# Patient Record
Sex: MALE | Race: WHITE
[De-identification: names, ages, dates, MRNs, and addresses within clinical notes are randomized per-mention and may not be internally consistent; named-entity substitution may affect disease eponyms.]

---

## 2023-07-05 ENCOUNTER — HOSPITAL ENCOUNTER (EMERGENCY)
Dept: HOSPITAL 74 - JERFT | Age: 53
Discharge: HOME | End: 2023-07-05
Payer: COMMERCIAL

## 2023-07-05 VITALS
HEART RATE: 112 BPM | SYSTOLIC BLOOD PRESSURE: 150 MMHG | RESPIRATION RATE: 18 BRPM | TEMPERATURE: 97.6 F | DIASTOLIC BLOOD PRESSURE: 90 MMHG

## 2023-07-05 VITALS — BODY MASS INDEX: 40.4 KG/M2
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
## 2023-07-05 DIAGNOSIS — S61.210A: Primary | ICD-10-CM

## 2024-01-17 PROBLEM — Z00.00 ENCOUNTER FOR PREVENTIVE HEALTH EXAMINATION: Status: ACTIVE | Noted: 2024-01-17

## 2024-01-18 ENCOUNTER — APPOINTMENT (OUTPATIENT)
Dept: SURGERY | Facility: CLINIC | Age: 54
End: 2024-01-18
Payer: COMMERCIAL

## 2024-01-18 ENCOUNTER — NON-APPOINTMENT (OUTPATIENT)
Age: 54
End: 2024-01-18

## 2024-01-18 VITALS
DIASTOLIC BLOOD PRESSURE: 82 MMHG | TEMPERATURE: 98.2 F | WEIGHT: 298 LBS | SYSTOLIC BLOOD PRESSURE: 140 MMHG | HEART RATE: 116 BPM | HEIGHT: 74 IN | RESPIRATION RATE: 16 BRPM | BODY MASS INDEX: 38.24 KG/M2 | OXYGEN SATURATION: 96 %

## 2024-01-18 PROCEDURE — 99204 OFFICE O/P NEW MOD 45 MIN: CPT

## 2024-01-19 ENCOUNTER — TRANSCRIPTION ENCOUNTER (OUTPATIENT)
Age: 54
End: 2024-01-19

## 2024-01-26 ENCOUNTER — RESULT REVIEW (OUTPATIENT)
Age: 54
End: 2024-01-26

## 2024-01-26 ENCOUNTER — APPOINTMENT (OUTPATIENT)
Dept: HEMATOLOGY ONCOLOGY | Facility: CLINIC | Age: 54
End: 2024-01-26
Payer: COMMERCIAL

## 2024-01-26 VITALS
TEMPERATURE: 97.7 F | BODY MASS INDEX: 38.89 KG/M2 | DIASTOLIC BLOOD PRESSURE: 83 MMHG | SYSTOLIC BLOOD PRESSURE: 140 MMHG | WEIGHT: 303 LBS | HEART RATE: 118 BPM | HEIGHT: 74 IN | OXYGEN SATURATION: 97 % | RESPIRATION RATE: 16 BRPM

## 2024-01-26 DIAGNOSIS — Z86.79 PERSONAL HISTORY OF OTHER DISEASES OF THE CIRCULATORY SYSTEM: ICD-10-CM

## 2024-01-26 DIAGNOSIS — Z87.19 PERSONAL HISTORY OF OTHER DISEASES OF THE DIGESTIVE SYSTEM: ICD-10-CM

## 2024-01-26 DIAGNOSIS — Z87.828 PERSONAL HISTORY OF OTHER (HEALED) PHYSICAL INJURY AND TRAUMA: ICD-10-CM

## 2024-01-26 DIAGNOSIS — Z86.39 PERSONAL HISTORY OF OTHER ENDOCRINE, NUTRITIONAL AND METABOLIC DISEASE: ICD-10-CM

## 2024-01-26 DIAGNOSIS — Z87.891 PERSONAL HISTORY OF NICOTINE DEPENDENCE: ICD-10-CM

## 2024-01-26 DIAGNOSIS — M19.90 UNSPECIFIED OSTEOARTHRITIS, UNSPECIFIED SITE: ICD-10-CM

## 2024-01-26 DIAGNOSIS — Z80.0 FAMILY HISTORY OF MALIGNANT NEOPLASM OF DIGESTIVE ORGANS: ICD-10-CM

## 2024-01-26 DIAGNOSIS — I10 ESSENTIAL (PRIMARY) HYPERTENSION: ICD-10-CM

## 2024-01-26 PROCEDURE — G2212 PROLONG OUTPT/OFFICE VIS: CPT

## 2024-01-26 PROCEDURE — 99205 OFFICE O/P NEW HI 60 MIN: CPT

## 2024-01-26 RX ORDER — LEVOTHYROXINE SODIUM 0.17 MG/1
TABLET ORAL
Refills: 0 | Status: ACTIVE | COMMUNITY

## 2024-01-26 RX ORDER — MERCAPTOPURINE 50 MG/1
50 TABLET ORAL
Refills: 0 | Status: ACTIVE | COMMUNITY

## 2024-01-26 RX ORDER — ENALAPRIL MALEATE 5 MG/1
TABLET ORAL
Refills: 0 | Status: COMPLETED | COMMUNITY
End: 2024-01-26

## 2024-01-26 RX ORDER — ASPIRIN 81 MG
81 TABLET, DELAYED RELEASE (ENTERIC COATED) ORAL
Refills: 0 | Status: COMPLETED | COMMUNITY
End: 2024-01-26

## 2024-01-29 NOTE — ASSESSMENT
[FreeTextEntry1] : Mr.Eugene Myers is a 53 year old male who presents to the office for r/o colon cancer. Recent admission for bowel obstruction in 1/2024 with colonoscopy showing a proximal stricture with biopsy around the rim showing 1 area with at least intramucosal adenocarcinoma. In the rectum, there was an ulcerated area with focal high-grade dysplasia in the background of low-grade dysplasia.   Given at least intramucosal adenocarcinoma and the rectal findings, it was recommended to undergo total proctocolectomy. However, patient is very hesitant and would like to evaluate other possible options.   Discussed at length the pathology findings which could signify that the intramucosal lesion is a carryover from the rectal area or the possibility that these are two synchronous lesions. Explained that a total proctocolectomy would provide the greatest possibility of removal of all possible tumor.  If he chooses not to undergo the surgery, another option might be to undergo hemicolectomy and continue close observation of rectal lesion.  Will obtain MRI to better visualize rectal area.  Will discuss with Dr. Lyons  (colorectal surgery)  >CBC, CMP, CEA >MRI Pelvis to evaluate rectal area >benefit from TB discussion  RTC in 2 weeks

## 2024-01-29 NOTE — HISTORY OF PRESENT ILLNESS
[de-identified] : Mr.Eugene Myers is a 53 year old male who presents to the office for r/o colon cancer.  In 1/2/2024, patient was admitted for severe abdominal pain and was found to have a bowel obstruction.  Colonoscopy showed a stricture with polypoid feature proximal colon-suspect malignant, and a 6mm sigmoid colon polyp. biopsy around the rim and rectal area showed 1 area with at least intramucosal adenocarcinoma. In the rectum, there was an ulcerated area with focal high-grade dysplasia in the background of low-grade dysplasia.   CT C/A/P 1/2/2024: obstructing/stricture ring lesion at the splenic flexure of the colon, most likely adenocarcinoma. No evidence of metastatic disease. Last colonoscopy 10/2023 was negative  Patient had an NG tube placed for decompression. In the hospital he saw a colorectal surgeon who recommended a total proctocolectomy with ileal pouch.  Here today with daughter and aunt. He feels much better since discharge. No longer has abdominal pain. Has seen surgery and is very hesitant to agree to total proctocolectomy.  PATHOLOGY at [facility]: At the site of the right rim of structure: the microscopic description showed: one fragment of inflammatory type polyp and one somewhat degenerated fragment of mucosa with intersecting glands composed of mitotically active irregular nuclei, concerning for at least intramucosal adenocarcinoma. These cells largely lack goblet cells and do not appear classically colonic or intestinal.  No lymphocascular invasion                                 Health Maintenace:  Colonoscopy: 8 months before this instance: March 2023 - negative Prostate exam: 10 years ago  Family hx:  Mother: throat ca: 50s   Social hx:  Smoker: former smoker: 15 years 1.5 packs a day ETOH: none Illicit drug: none Work:

## 2024-01-29 NOTE — PHYSICAL EXAM
[Obese] : obese [Normal] : normal appearance, no rash, nodules, vesicles, ulcers, erythema [de-identified] : umbilical hernia

## 2024-02-06 ENCOUNTER — RESULT REVIEW (OUTPATIENT)
Age: 54
End: 2024-02-06

## 2024-02-07 ENCOUNTER — NON-APPOINTMENT (OUTPATIENT)
Age: 54
End: 2024-02-07

## 2024-02-09 ENCOUNTER — APPOINTMENT (OUTPATIENT)
Dept: HEMATOLOGY ONCOLOGY | Facility: CLINIC | Age: 54
End: 2024-02-09
Payer: COMMERCIAL

## 2024-02-09 VITALS
WEIGHT: 310.19 LBS | TEMPERATURE: 97.6 F | DIASTOLIC BLOOD PRESSURE: 83 MMHG | HEART RATE: 61 BPM | OXYGEN SATURATION: 97 % | HEIGHT: 74 IN | BODY MASS INDEX: 39.81 KG/M2 | RESPIRATION RATE: 16 BRPM | SYSTOLIC BLOOD PRESSURE: 159 MMHG

## 2024-02-09 VITALS — DIASTOLIC BLOOD PRESSURE: 83 MMHG | SYSTOLIC BLOOD PRESSURE: 144 MMHG

## 2024-02-09 PROCEDURE — 99214 OFFICE O/P EST MOD 30 MIN: CPT

## 2024-02-09 RX ORDER — SODIUM SULFATE, POTASSIUM SULFATE AND MAGNESIUM SULFATE 1.6; 3.13; 17.5 G/177ML; G/177ML; G/177ML
17.5-3.13-1.6 SOLUTION ORAL
Qty: 1 | Refills: 0 | Status: ACTIVE | COMMUNITY
Start: 2024-02-09 | End: 1900-01-01

## 2024-02-09 NOTE — PHYSICAL EXAM
[Obese] : obese [Normal] : normal appearance, no rash, nodules, vesicles, ulcers, erythema [Fully active, able to carry on all pre-disease performance without restriction] : Status 0 - Fully active, able to carry on all pre-disease performance without restriction [de-identified] : umbilical hernia

## 2024-02-09 NOTE — ASSESSMENT
[FreeTextEntry1] : Mr.Eugene Myers is a 53 year old male who presents to the office for r/o colon cancer. Recent admission for bowel obstruction in 1/2024 with colonoscopy showing a proximal stricture with biopsy around the rim showing 1 area with at least intramucosal adenocarcinoma. In the rectum, there was an ulcerated area with focal high-grade dysplasia in the background of low-grade dysplasia.   Given at least intramucosal adenocarcinoma and the rectal findings, it was recommended to undergo total proctocolectomy. However, patient is very hesitant and would like to evaluate other possible options.   Discussed at length the pathology findings which could signify that the intramucosal lesion is a carryover from the rectal area or the possibility that these are two synchronous lesions. Explained that a total proctocolectomy would provide the greatest possibility of removal of all possible tumor.  If he chooses not to undergo the surgery, another option might be to undergo hemicolectomy and continue close observation of rectal lesion.  MRI showed no visible tumor or LAD. No indication for oncologic treatment. Discussed again with patient the importance of undergoing surgery however will repeat colonoscopy on Monday to re-evaluate rectal area.   Patient states he will decide which surgery he would like to proceed with by next week pending colonoscopy results.   >follow up with GI for colonoscopy on 2/12 >follow up with surgery- Dr. Lyons

## 2024-02-11 ENCOUNTER — RESULT REVIEW (OUTPATIENT)
Age: 54
End: 2024-02-11

## 2024-02-12 ENCOUNTER — APPOINTMENT (OUTPATIENT)
Dept: GASTROENTEROLOGY | Facility: HOSPITAL | Age: 54
End: 2024-02-12

## 2024-02-20 ENCOUNTER — APPOINTMENT (OUTPATIENT)
Dept: SURGERY | Facility: CLINIC | Age: 54
End: 2024-02-20
Payer: COMMERCIAL

## 2024-02-20 ENCOUNTER — NON-APPOINTMENT (OUTPATIENT)
Age: 54
End: 2024-02-20

## 2024-02-20 VITALS
HEART RATE: 121 BPM | HEIGHT: 74 IN | DIASTOLIC BLOOD PRESSURE: 91 MMHG | WEIGHT: 310 LBS | OXYGEN SATURATION: 97 % | SYSTOLIC BLOOD PRESSURE: 150 MMHG | BODY MASS INDEX: 39.78 KG/M2 | RESPIRATION RATE: 16 BRPM

## 2024-02-20 PROCEDURE — 99213 OFFICE O/P EST LOW 20 MIN: CPT

## 2024-02-20 RX ORDER — METRONIDAZOLE 500 MG/1
500 TABLET ORAL
Qty: 6 | Refills: 0 | Status: ACTIVE | COMMUNITY
Start: 2024-02-20 | End: 1900-01-01

## 2024-02-20 RX ORDER — NEOMYCIN SULFATE 500 MG/1
500 TABLET ORAL
Qty: 6 | Refills: 0 | Status: ACTIVE | COMMUNITY
Start: 2024-02-20 | End: 1900-01-01

## 2024-02-20 NOTE — DATA REVIEWED
[FreeTextEntry1] : Outside CTs and admission for possible reviewed.  Hepatic flexure lesion with large bowel obstruction.

## 2024-02-20 NOTE — HISTORY OF PRESENT ILLNESS
[FreeTextEntry1] : 53-year-old male here for initial valuation of recent mission to [facility] for large bowel obstruction.  Patient underwent CT abdomen pelvis at that time which showed a stricture at the hepatic flexure.  The initial CT read was showing a splenic flexure lesion but this was addended and noted to be at the hepatic flexure.  He subsequent underwent a colonoscopy which showed an obstructing lesion at the hepatic flexure which was biopsied as invasive adenocarcinoma.  There is some possible induration in the rectum which was also biopsied and was found to be high-grade dysplasia.  Patient has a history of ulcerative colitis for at least the past 6 years and has had annual colonoscopies.  His last colonoscopy was in 2023.  He considers his UC generally well controlled now that he is on medication.  Based on the invasive cancer and the fact flexure and dysplasia in the rectum the patient was recommended to undergo a total proctocolectomy.  He is here for second opinion regarding these recommendations.  He feels much better since the discharge from the hospital.  Is been eating and drink without issue.  Has been careful about his diet so he does not have an obstruction while deciding steps.  He underwent a CT chest in hospital as well which showed no lesions in the chest.  Patient has significant concerns about his ability to live with a permanent end ileostomy or a J-pouch as a self-employed  with limited financial resources.

## 2024-02-20 NOTE — PHYSICAL EXAM
[Abdomen Masses] : No abdominal masses [Abdomen Tenderness] : ~T No ~M abdominal tenderness [JVD] : no jugular venous distention  [Respiratory Effort] : normal respiratory effort [No Rash or Lesion] : No rash or lesion [Alert] : alert [Calm] : calm [de-identified] : No acute distress

## 2024-02-20 NOTE — HISTORY OF PRESENT ILLNESS
[FreeTextEntry1] : 53-year-old male here for initial valuation of recent mission to [facility] for large bowel obstruction.  Patient underwent CT abdomen pelvis at that time which showed a stricture at the hepatic flexure.  The initial CT read was showing a splenic flexure lesion but this was addended and noted to be at the hepatic flexure.  He subsequent underwent a colonoscopy which showed an obstructing lesion at the hepatic flexure which was biopsied as invasive adenocarcinoma.  There is some possible induration in the rectum which was also biopsied and was found to be high-grade dysplasia.  Patient has a history of ulcerative colitis for at least the past 6 years and has had annual colonoscopies.  His last colonoscopy was in 2023.  He considers his UC generally well controlled now that he is on medication.  Based on the invasive cancer and the fact flexure and dysplasia in the rectum the patient was recommended to undergo a total proctocolectomy.  He is here for second opinion regarding these recommendations.  He feels much better since the discharge from the hospital.  Is been eating and drink without issue.  Has been careful about his diet so he does not have an obstruction while deciding steps.  He underwent a CT chest in hospital as well which showed no lesions in the chest.  Patient has significant concerns about his ability to live with a permanent end ileostomy or a J-pouch as a self-employed  with limited financial resources.  Update-patient here for follow-up having completed a pelvic MRI per oncology recommendations to assess any rectal lesions and a colonoscopy with Dr. Mae.  The pelvic MRI shows no lesions in the rectum.  Dr. Damian completed colonoscopy to the hepatic flexure which showed invasive lesion which she tattooed distally.  Biopsies from this came back as invasive adenocarcinoma.  He did not identify any dysplastic appearing areas in the remaining portion of the colon and the rectum appeared completely normal at this time.  A biopsy from 1 area in the descending colon showed hyperplastic polyp.  Had extensive discussion again with the patient regarding surgical options would like to proceed with robotic assisted right hemicolectomy.

## 2024-02-20 NOTE — PHYSICAL EXAM
[Abdomen Masses] : No abdominal masses [Abdomen Tenderness] : ~T No ~M abdominal tenderness [JVD] : no jugular venous distention  [Respiratory Effort] : normal respiratory effort [No Rash or Lesion] : No rash or lesion [Alert] : alert [Calm] : calm [de-identified] : No acute distress

## 2024-02-20 NOTE — ASSESSMENT
[FreeTextEntry1] : 53-year-old male here for initial valuation for history of ulcerative colitis and invasive adenocarcinoma of the hepatic flexure.  Initial biopsies done outside also show concern for dysplasia in the rectum.  Reviewed with the patient that the standard of care would be a total proctocolectomy if he does have multifocal high-grade dysplasia or invasive cancer 1 area in high-grade dysplasia and another.  Patient like a second opinion so we will recommend that he have a repeat colonoscopy with Dr. Mae to assess the primary lesion as well as repeat biopsies for any abnormal appearing areas in the remaining colon.  Patient will follow-up with oncology as well.  Return for follow-up after completing colonoscopy so that we can have further discussions about next steps for surgery.

## 2024-02-20 NOTE — ASSESSMENT
[FreeTextEntry1] : 53-year-old male with ulcerative colitis and a recently diagnosed invasive adenocarcinoma in the hepatic flexure.  No evidence of distant metastatic disease on CT chest abdomen pelvis.  CEA was normal.  Once again had a long discussion about options for surgery including total proctocolectomy with end ileostomy versus total proctocolectomy with J-pouch versus segmental right hemicolectomy.  Patient expressed significant concern over the issues he will faced with a J-pouch and end ileostomy as a self-employed .  He would like to proceed with a robotic assisted right hemicolectomy understanding the risk for invasive cancer in the remaining portion of his colon.  Risk benefits of robotic assisted right hemicolectomy discussed.  Risk include infection, bleeding, anastomotic complications such as abscess or leak.  Risk of DVT and PE as well.  Patient will complete full bowel prep prior to surgery.  Neomycin and Flagyl ordered to his pharmacy.  Will get presurgical testing for risk assessment and appropriate labs.  Will need close colonoscopy follow-up after resection with the increased risk for invasive adenocarcinoma in the remaining portion of the colon.

## 2024-02-21 ENCOUNTER — TRANSCRIPTION ENCOUNTER (OUTPATIENT)
Age: 54
End: 2024-02-21

## 2024-02-28 ENCOUNTER — RESULT REVIEW (OUTPATIENT)
Age: 54
End: 2024-02-28

## 2024-02-29 ENCOUNTER — APPOINTMENT (OUTPATIENT)
Dept: SURGERY | Facility: HOSPITAL | Age: 54
End: 2024-02-29

## 2024-03-01 ENCOUNTER — TRANSCRIPTION ENCOUNTER (OUTPATIENT)
Age: 54
End: 2024-03-01

## 2024-03-15 ENCOUNTER — RESULT REVIEW (OUTPATIENT)
Age: 54
End: 2024-03-15

## 2024-03-15 ENCOUNTER — APPOINTMENT (OUTPATIENT)
Dept: HEMATOLOGY ONCOLOGY | Facility: CLINIC | Age: 54
End: 2024-03-15
Payer: COMMERCIAL

## 2024-03-15 ENCOUNTER — APPOINTMENT (OUTPATIENT)
Dept: SURGERY | Facility: CLINIC | Age: 54
End: 2024-03-15
Payer: COMMERCIAL

## 2024-03-15 VITALS
HEART RATE: 103 BPM | WEIGHT: 310 LBS | BODY MASS INDEX: 39.78 KG/M2 | HEIGHT: 74 IN | SYSTOLIC BLOOD PRESSURE: 132 MMHG | DIASTOLIC BLOOD PRESSURE: 85 MMHG

## 2024-03-15 VITALS
BODY MASS INDEX: 38.89 KG/M2 | TEMPERATURE: 97.6 F | HEIGHT: 74 IN | HEART RATE: 105 BPM | SYSTOLIC BLOOD PRESSURE: 131 MMHG | WEIGHT: 303 LBS | OXYGEN SATURATION: 96 % | RESPIRATION RATE: 16 BRPM | DIASTOLIC BLOOD PRESSURE: 82 MMHG

## 2024-03-15 PROCEDURE — 99215 OFFICE O/P EST HI 40 MIN: CPT

## 2024-03-15 PROCEDURE — G2211 COMPLEX E/M VISIT ADD ON: CPT

## 2024-03-15 PROCEDURE — 99024 POSTOP FOLLOW-UP VISIT: CPT

## 2024-03-15 NOTE — ASSESSMENT
[FreeTextEntry1] : 53-year-old male here for postop visit after robotic assisted right hemicolectomy for hepatic flexure mass consistent with stage III colon cancer.  Reviewed the staging of colon cancer the patient and the meaning of positive lymph nodes.  Patient will follow-up with Dr. Ferrera later today to discuss adjuvant chemotherapy.  Reviewed expectations for recovery over the next 4 to 6 weeks.  Bowel function will continue to resolve over the next 3 to 6 months.  Can increase his fiber which will help normal return of bowel function.  Recommend follow-up with Dr. Mae for surveillance colonoscopy given his prior findings on original colonoscopy of dysplasia in the rectum.  She would likely have a surveillance colonoscopy in 3 to 6 months as opposed to usual 1 year follow-up.  He would also like to establish primary GI care with Dr. Mae for his ulcerative colitis.  Follow-up in 4 to 6 weeks to assess recovery and return of normal bowel function.

## 2024-03-15 NOTE — PHYSICAL EXAM
[Abdomen Masses] : No abdominal masses [Abdomen Tenderness] : ~T No ~M abdominal tenderness [Respiratory Effort] : normal respiratory effort [JVD] : no jugular venous distention  [No Rash or Lesion] : No rash or lesion [Alert] : alert [de-identified] : Incisions clean dry and intact without erythema or drainage.  Surgical glue starting to fall off. [Calm] : calm [de-identified] : No acute distress

## 2024-03-15 NOTE — HISTORY OF PRESENT ILLNESS
[FreeTextEntry1] : 53-year-old male returns for follow-up after robotic assisted right hemicolectomy for hepatic flexure colon cancer.  Patient also has a history of ulcerative pancolitis.  Reviewed pathology with the patient showing a stage III cancer with involvement of 8 lymph nodes.  Patient has follow-up with Dr. Ferrera after this visit to discuss adjuvant chemotherapy.  Recommend patient also follow-up with Dr. Mae for surveillance colonoscopy.  Patient noted doing well since surgery.  Has occasional tender the pain that continue to improve.  Still has significantly loose bowel movements although they are slowly improving.  Believes incisions are healing well.

## 2024-03-17 NOTE — PHYSICAL EXAM
[Fully active, able to carry on all pre-disease performance without restriction] : Status 0 - Fully active, able to carry on all pre-disease performance without restriction [Obese] : obese [Normal] : normal appearance, no rash, nodules, vesicles, ulcers, erythema [de-identified] : umbilical hernia. well healing laparoscopic entry points and suprapubic scar.

## 2024-03-17 NOTE — HISTORY OF PRESENT ILLNESS
[ECOG Performance Status: 0 - Fully active, able to carry on all pre-disease performance without restriction] : Performance Status: 0 - Fully active, able to carry on all pre-disease performance without restriction [de-identified] : Since last visit, patient is now s/p robotic assisted right hemicolectomy after repeat obstruction on 2/29/24. Pathology showed Invasive adenocarcinoma, poorly differentiated, with focal mucinous features. 8/23 LN positive. VAL. Patient is healing well from surgery. Denies any abdominal pain or difficulty eating. No blood in stool.     [de-identified] : Mr.Eugene Myers is a 53 year old male who presents to the office for r/o colon cancer.  In 1/2/2024, patient was admitted for severe abdominal pain and was found to have a bowel obstruction.  Colonoscopy showed a stricture with polypoid feature proximal colon-suspect malignant, and a 6mm sigmoid colon polyp. biopsy around the rim and rectal area showed 1 area with at least intramucosal adenocarcinoma. In the rectum, there was an ulcerated area with focal high-grade dysplasia in the background of low-grade dysplasia.   CT C/A/P 1/2/2024: obstructing/stricture ring lesion at the splenic flexure of the colon, most likely adenocarcinoma. No evidence of metastatic disease. Last colonoscopy 10/2023 was negative  Patient had an NG tube placed for decompression. In the hospital he saw a colorectal surgeon who recommended a total proctocolectomy with ileal pouch.  Here today with daughter and aunt. He feels much better since discharge. No longer has abdominal pain. Has seen surgery and is very hesitant to agree to total proctocolectomy.  PATHOLOGY at [facility]: At the site of the right rim of structure: the microscopic description showed: one fragment of inflammatory type polyp and one somewhat degenerated fragment of mucosa with intersecting glands composed of mitotically active irregular nuclei, concerning for at least intramucosal adenocarcinoma. These cells largely lack goblet cells and do not appear classically colonic or intestinal.  No lymphocascular invasion                                 Health Maintenace:  Colonoscopy: 8 months before this instance: March 2023 - negative Prostate exam: 10 years ago  Family hx:  Mother: throat ca: 50s   Social hx:  Smoker: former smoker: 15 years 1.5 packs a day ETOH: none Illicit drug: none Work:

## 2024-03-17 NOTE — REASON FOR VISIT
[Initial Consultation] : an initial consultation [Family Member] : family member [Follow-Up Visit] : a follow-up [FreeTextEntry2] : colon cancer

## 2024-03-17 NOTE — ASSESSMENT
[FreeTextEntry1] : Mr.Eugene Myers is a 53 year old male with hx of ulcerative colitis with recurrent bowel obstructions due to proximal stricture which on initial biopsy around the rim showing 1 area with at least intramucosal adenocarcinoma. In the rectum, there was also an ulcerated area with focal high-grade dysplasia in the background of low-grade dysplasia. Repeat colonoscopy and MRI pelvis did not show disease in the rectum.  Now s/p robotic assisted right hemicolectomy for hepatic flexure mass consistent with stage III colon cancer.   Discussed in detail the diagnosis, prognosis and risk of recurrence. Recommend adjuvant chemotherapy with 6 months of FOLFOX.  Educational material of treatment plan provided to the patient.   >CBC, CMP, CEA >will send signatera >mediport referral >repeat CT C/A/P within 2 weeks >Discussed plan to start adjuvant treatment after at least 4 weeks post-surgery  Return in 2 weeks   TREATMENT PLAN: Fluorouracil 400 mg/m2 IV Push followed by 2400 mg/m2 continuous infusion over 46-48 hours Oxaliplatin 85 mg/m2 IV over 2 hours Leucovorin 400 mg/m2 IV over 2 hours  every 14 days for 12 cycles

## 2024-03-29 ENCOUNTER — APPOINTMENT (OUTPATIENT)
Dept: HEMATOLOGY ONCOLOGY | Facility: CLINIC | Age: 54
End: 2024-03-29
Payer: COMMERCIAL

## 2024-03-29 VITALS
WEIGHT: 306.25 LBS | TEMPERATURE: 97.4 F | DIASTOLIC BLOOD PRESSURE: 79 MMHG | HEART RATE: 110 BPM | BODY MASS INDEX: 39.3 KG/M2 | RESPIRATION RATE: 16 BRPM | OXYGEN SATURATION: 98 % | SYSTOLIC BLOOD PRESSURE: 123 MMHG | HEIGHT: 74 IN

## 2024-03-29 PROCEDURE — 99215 OFFICE O/P EST HI 40 MIN: CPT

## 2024-03-29 PROCEDURE — G2211 COMPLEX E/M VISIT ADD ON: CPT

## 2024-03-29 NOTE — HISTORY OF PRESENT ILLNESS
[ECOG Performance Status: 0 - Fully active, able to carry on all pre-disease performance without restriction] : Performance Status: 0 - Fully active, able to carry on all pre-disease performance without restriction [T: ___] : T[unfilled] [N: ___] : N[unfilled] [M: ___] : M[unfilled] [AJCC Stage: ____] : AJCC Stage: [unfilled] [de-identified] : Mr.Eugene Myers is a 53 year old male who presents to the office for r/o colon cancer.  In 1/2/2024, patient was admitted for severe abdominal pain and was found to have a bowel obstruction.  Colonoscopy showed a stricture with polypoid feature proximal colon-suspect malignant, and a 6mm sigmoid colon polyp. biopsy around the rim and rectal area showed 1 area with at least intramucosal adenocarcinoma. In the rectum, there was an ulcerated area with focal high-grade dysplasia in the background of low-grade dysplasia.   CT C/A/P 1/2/2024: obstructing/stricture ring lesion at the splenic flexure of the colon, most likely adenocarcinoma. No evidence of metastatic disease. Last colonoscopy 10/2023 was negative  Patient had an NG tube placed for decompression. In the hospital he saw a colorectal surgeon who recommended a total proctocolectomy with ileal pouch.  Here today with daughter and aunt. He feels much better since discharge. No longer has abdominal pain. Has seen surgery and is very hesitant to agree to total proctocolectomy.  PATHOLOGY at [facility]: At the site of the right rim of structure: the microscopic description showed: one fragment of inflammatory type polyp and one somewhat degenerated fragment of mucosa with intersecting glands composed of mitotically active irregular nuclei, concerning for at least intramucosal adenocarcinoma. These cells largely lack goblet cells and do not appear classically colonic or intestinal.  No lymphocascular invasion   s/p robotic assisted right hemicolectomy after repeat obstruction on 2/29/24.   Pathology showed Invasive adenocarcinoma, poorly differentiated, with focal mucinous features. 8/23 LN positive. VAL.   FINAL DIAGNOSIS  A. RIGHT COLON: -Invasive adenocarcinoma, poorly differentiated, with focal mucinous features, 3.2 cm in greatest diameter, extending through muscularis propria into pericolonic fat, with focal involvement of serosal surface -Resection margins negative for adenocarcinoma -Perineural invasion, lymphatic invasion and vascular (intramural) invasion are seen -Tumor Bud score = 12 (high) -Eight of twenty-three lymph nodes, positive for carcinoma (8/23)                              Health Maintenace:  Colonoscopy: 8 months before this instance: March 2023 - negative Prostate exam: 10 years ago  Family hx:  Mother: throat ca: 50s   Social hx:  Smoker: former smoker: 15 years 1.5 packs a day ETOH: none Illicit drug: none Work:     [de-identified] : Patient is healing well from surgery. Denies any abdominal pain or difficulty eating. Has been having diarrhea daily. Did not get port. Patient is apprehensive about getting a port and would like to defer at this time.  CT C/A/P scheduled for 4/5/24     [0 - No Distress] : Distress Level: 0

## 2024-03-29 NOTE — PHYSICAL EXAM
[Fully active, able to carry on all pre-disease performance without restriction] : Status 0 - Fully active, able to carry on all pre-disease performance without restriction [Obese] : obese [Normal] : normal appearance, no rash, nodules, vesicles, ulcers, erythema [de-identified] : umbilical hernia. well healing laparoscopic entry points and suprapubic scar.

## 2024-04-05 ENCOUNTER — RESULT REVIEW (OUTPATIENT)
Age: 54
End: 2024-04-05

## 2024-04-07 ENCOUNTER — NON-APPOINTMENT (OUTPATIENT)
Age: 54
End: 2024-04-07

## 2024-04-10 ENCOUNTER — NON-APPOINTMENT (OUTPATIENT)
Age: 54
End: 2024-04-10

## 2024-04-10 ENCOUNTER — APPOINTMENT (OUTPATIENT)
Dept: HEMATOLOGY ONCOLOGY | Facility: CLINIC | Age: 54
End: 2024-04-10
Payer: COMMERCIAL

## 2024-04-10 ENCOUNTER — APPOINTMENT (OUTPATIENT)
Dept: HEMATOLOGY ONCOLOGY | Facility: CLINIC | Age: 54
End: 2024-04-10

## 2024-04-10 ENCOUNTER — RESULT REVIEW (OUTPATIENT)
Age: 54
End: 2024-04-10

## 2024-04-10 VITALS
OXYGEN SATURATION: 100 % | SYSTOLIC BLOOD PRESSURE: 125 MMHG | HEIGHT: 74 IN | WEIGHT: 306.31 LBS | HEART RATE: 76 BPM | RESPIRATION RATE: 16 BRPM | BODY MASS INDEX: 39.31 KG/M2 | DIASTOLIC BLOOD PRESSURE: 86 MMHG | TEMPERATURE: 97.3 F

## 2024-04-10 PROCEDURE — 99214 OFFICE O/P EST MOD 30 MIN: CPT

## 2024-04-10 RX ORDER — ONDANSETRON 8 MG/1
8 TABLET, ORALLY DISINTEGRATING ORAL
Qty: 16 | Refills: 1 | Status: ACTIVE | COMMUNITY
Start: 2024-04-10 | End: 1900-01-01

## 2024-04-10 RX ORDER — PROCHLORPERAZINE MALEATE 10 MG/1
10 TABLET ORAL
Qty: 30 | Refills: 1 | Status: ACTIVE | COMMUNITY
Start: 2024-04-10 | End: 1900-01-01

## 2024-04-10 NOTE — ASSESSMENT
[FreeTextEntry1] : Mr.Eugene Myers is a 53 year old male with hx of ulcerative colitis with recurrent bowel obstructions due to proximal stricture which on initial biopsy around the rim showing 1 area with at least intramucosal adenocarcinoma. In the rectum, there was also an ulcerated area with focal high-grade dysplasia in the background of low-grade dysplasia. Repeat colonoscopy and MRI pelvis did not show disease in the rectum.  Now s/p robotic assisted right hemicolectomy for hepatic flexure mass consistent with stage IIIC colon cancer.  High risk Stage III (N2b disease) 8/23 LN positive + LVI, PNI Margins neg VAL  Discussed in detail the diagnosis, prognosis and risk of recurrence.   In patients with colon cancer staged as T4, N1-2 or T any, N2 (high-risk stage III), 3 months of FOLFOX is inferior to 6 months of FOLFOX for DFS, whereas non-inferiority of 3 versus 6 months of CAPEOX has not been  proven  Recommend adjuvant chemotherapy with 6 months of FOLFOX. However, patient would like to defer port placement, therefore would have to be CAPEOX. Discussed my concern with Xeloda given hx of UC and diarrhea, however patient prefers to try Xeloda.  Educational material of treatment plan provided to the patient.   >CBC, CMP prior to treatments, CEA monthly > signatera sent- result in process. >CT C/A/P- scheduled on 4/5/24 >plan to start adjuvant treatment on 4/10/24  Return on treatment day  TREATMENT PLAN: Capecitabine 850-1000mg/m2 po twice daily Day 1- Day 14 followed by 7 days off Oxaliplatin 130 mg/m2 IV over 2 hours every 21 days for 6 months Plan: Cycle 1

## 2024-04-10 NOTE — HISTORY OF PRESENT ILLNESS
[Disease: _____________________] : Disease: [unfilled] [T: ___] : T[unfilled] [N: ___] : N[unfilled] [M: ___] : M[unfilled] [AJCC Stage: ____] : AJCC Stage: [unfilled] [Therapy: ___] : Therapy: [unfilled] [Cycle: ___] : Cycle: [unfilled] [Day: ___] : Day: [unfilled] [0 - No Distress] : Distress Level: 0 [ECOG Performance Status: 0 - Fully active, able to carry on all pre-disease performance without restriction] : Performance Status: 0 - Fully active, able to carry on all pre-disease performance without restriction [de-identified] : Mr.Eugene Myers is a 53 year old male who presents to the office for r/o colon cancer.  In 1/2/2024, patient was admitted for severe abdominal pain and was found to have a bowel obstruction.  Colonoscopy showed a stricture with polypoid feature proximal colon-suspect malignant, and a 6mm sigmoid colon polyp. biopsy around the rim and rectal area showed 1 area with at least intramucosal adenocarcinoma. In the rectum, there was an ulcerated area with focal high-grade dysplasia in the background of low-grade dysplasia.   CT C/A/P 1/2/2024: obstructing/stricture ring lesion at the splenic flexure of the colon, most likely adenocarcinoma. No evidence of metastatic disease. Last colonoscopy 10/2023 was negative  Patient had an NG tube placed for decompression. In the hospital he saw a colorectal surgeon who recommended a total proctocolectomy with ileal pouch.  Here today with daughter and aunt. He feels much better since discharge. No longer has abdominal pain. Has seen surgery and is very hesitant to agree to total proctocolectomy.  PATHOLOGY at [facility]: At the site of the right rim of structure: the microscopic description showed: one fragment of inflammatory type polyp and one somewhat degenerated fragment of mucosa with intersecting glands composed of mitotically active irregular nuclei, concerning for at least intramucosal adenocarcinoma. These cells largely lack goblet cells and do not appear classically colonic or intestinal.  No lymphocascular invasion   s/p robotic assisted right hemicolectomy after repeat obstruction on 2/29/24.   Pathology showed Invasive adenocarcinoma, poorly differentiated, with focal mucinous features. 8/23 LN positive. VAL.   FINAL DIAGNOSIS  A. RIGHT COLON: -Invasive adenocarcinoma, poorly differentiated, with focal mucinous features, 3.2 cm in greatest diameter, extending through muscularis propria into pericolonic fat, with focal involvement of serosal surface -Resection margins negative for adenocarcinoma -Perineural invasion, lymphatic invasion and vascular (intramural) invasion are seen -Tumor Bud score = 12 (high) -Eight of twenty-three lymph nodes, positive for carcinoma (8/23)                              Health Maintenace:  Colonoscopy: 8 months before this instance: March 2023 - negative Prostate exam: 10 years ago  Family hx:  Mother: throat ca: 50s   Social hx:  Smoker: former smoker: 15 years 1.5 packs a day ETOH: none Illicit drug: none Work:     [de-identified] : adenocarcinoma [de-identified] : Patient presents for cycle 1 CAPEOX. He has no complaints today. He had a CT C/A/P on 4/5/24 which is still pending. We discussed possible side effects, toxicities of chemotherapy including hand/foot syndrome, lowering of blood counts, cold sensitivity and possible laryngospasm with cold and pancytopenia.  We again reviewed that Oxaliplatin has vesicant properties, and the preferred method of administration is via mediport.  He is again declining placement of a mediport at this time  and understands the risks of peripheral administration.   He has occasional constipation.  He is healing well from surgery. Denies any abdominal pain or difficulty eating.

## 2024-04-10 NOTE — PHYSICAL EXAM
[Fully active, able to carry on all pre-disease performance without restriction] : Status 0 - Fully active, able to carry on all pre-disease performance without restriction [Obese] : obese [Normal] : normal appearance, no rash, nodules, vesicles, ulcers, erythema [de-identified] : umbilical hernia. well healing laparoscopic entry points and suprapubic scar.

## 2024-04-10 NOTE — HISTORY OF PRESENT ILLNESS
[T: ___] : T[unfilled] [N: ___] : N[unfilled] [M: ___] : M[unfilled] [AJCC Stage: ____] : AJCC Stage: [unfilled] [0 - No Distress] : Distress Level: 0 [ECOG Performance Status: 0 - Fully active, able to carry on all pre-disease performance without restriction] : Performance Status: 0 - Fully active, able to carry on all pre-disease performance without restriction [Disease: _____________________] : Disease: [unfilled] [Therapy: ___] : Therapy: [unfilled] [Cycle: ___] : Cycle: [unfilled] [Day: ___] : Day: [unfilled] [de-identified] : Mr.Eugene Myers is a 53 year old male who presents to the office for r/o colon cancer.  In 1/2/2024, patient was admitted for severe abdominal pain and was found to have a bowel obstruction.  Colonoscopy showed a stricture with polypoid feature proximal colon-suspect malignant, and a 6mm sigmoid colon polyp. biopsy around the rim and rectal area showed 1 area with at least intramucosal adenocarcinoma. In the rectum, there was an ulcerated area with focal high-grade dysplasia in the background of low-grade dysplasia.   CT C/A/P 1/2/2024: obstructing/stricture ring lesion at the splenic flexure of the colon, most likely adenocarcinoma. No evidence of metastatic disease. Last colonoscopy 10/2023 was negative  Patient had an NG tube placed for decompression. In the hospital he saw a colorectal surgeon who recommended a total proctocolectomy with ileal pouch.  Here today with daughter and aunt. He feels much better since discharge. No longer has abdominal pain. Has seen surgery and is very hesitant to agree to total proctocolectomy.  PATHOLOGY at [facility]: At the site of the right rim of structure: the microscopic description showed: one fragment of inflammatory type polyp and one somewhat degenerated fragment of mucosa with intersecting glands composed of mitotically active irregular nuclei, concerning for at least intramucosal adenocarcinoma. These cells largely lack goblet cells and do not appear classically colonic or intestinal.  No lymphocascular invasion   s/p robotic assisted right hemicolectomy after repeat obstruction on 2/29/24.   Pathology showed Invasive adenocarcinoma, poorly differentiated, with focal mucinous features. 8/23 LN positive. VAL.   FINAL DIAGNOSIS  A. RIGHT COLON: -Invasive adenocarcinoma, poorly differentiated, with focal mucinous features, 3.2 cm in greatest diameter, extending through muscularis propria into pericolonic fat, with focal involvement of serosal surface -Resection margins negative for adenocarcinoma -Perineural invasion, lymphatic invasion and vascular (intramural) invasion are seen -Tumor Bud score = 12 (high) -Eight of twenty-three lymph nodes, positive for carcinoma (8/23)                              Health Maintenace:  Colonoscopy: 8 months before this instance: March 2023 - negative Prostate exam: 10 years ago  Family hx:  Mother: throat ca: 50s   Social hx:  Smoker: former smoker: 15 years 1.5 packs a day ETOH: none Illicit drug: none Work:     [de-identified] : adenocarcinoma [de-identified] : Patient presents for cycle 1 CAPEOX. He has no complaints today. He had a CT C/A/P on 4/5/24 which is still pending. We discussed possible side effects, toxicities of chemotherapy including hand/foot syndrome, lowering of blood counts, cold sensitivity and possible laryngospasm with cold and pancytopenia.  We again reviewed that Oxaliplatin has vesicant properties, and the preferred method of administration is via mediport.  He is again declining placement of a mediport at this time  and understands the risks of peripheral administration.   He has occasional constipation.  He is healing well from surgery. Denies any abdominal pain or difficulty eating.

## 2024-04-10 NOTE — PHYSICAL EXAM
[Fully active, able to carry on all pre-disease performance without restriction] : Status 0 - Fully active, able to carry on all pre-disease performance without restriction [Obese] : obese [Normal] : normal appearance, no rash, nodules, vesicles, ulcers, erythema [de-identified] : umbilical hernia. well healing laparoscopic entry points and suprapubic scar.

## 2024-04-10 NOTE — ASSESSMENT
[FreeTextEntry1] : Mr.Eugene Myers is a 53 year old male with hx of ulcerative colitis with recurrent bowel obstructions due to proximal stricture which on initial biopsy around the rim showing 1 area with at least intramucosal adenocarcinoma. In the rectum, there was also an ulcerated area with focal high-grade dysplasia in the background of low-grade dysplasia. Repeat colonoscopy and MRI pelvis did not show disease in the rectum.  Now s/p robotic assisted right hemicolectomy for hepatic flexure mass consistent with stage IIIC colon cancer.  High risk Stage III (N2b disease) 8/23 LN positive + LVI, PNI Margins neg VAL  Discussed in detail the diagnosis, prognosis and risk of recurrence.   In patients with colon cancer staged as T4, N1-2 or T any, N2 (high-risk stage III), 3 months of FOLFOX is inferior to 6 months of FOLFOX for DFS, whereas non-inferiority of 3 versus 6 months of CAPEOX has not been  proven  Recommend adjuvant chemotherapy with 6 months of FOLFOX. However, patient would like to defer port placement, therefore would have to be CAPEOX. Discussed my concern with Xeloda given hx of UC and diarrhea, however patient prefers to try Xeloda.  Educational material of treatment plan provided to the patient.   >CBC, CMP prior to treatments, CEA monthly > signatera sent- result in process. >CT C/A/P- scheduled on 4/5/24 >plan to start adjuvant treatment on 4/10/24  Return on treatment day  TREATMENT PLAN: Capecitabine 850-1000mg/m2 po twice daily Day 1- Day 14 followed by 7 days off Oxaliplatin 130 mg/m2 IV over 2 hours every 21 days for 6 months Plan: >Cycle 1 day 1 Capeox.  We again reviewed that Oxaliplatin has vesicant properties, and the preferred method of administration is via mediport.  He is again declining placement of a mediport at this time and understands the risks of peripheral administration.  Side effects, toxicities reviewed, Pt expressed understanding.   > Ondansetron and Prochlorperazine sent to pt's pharmacy.  > Pt to start Capecitabine.  > History of present illness, review of systems, physical exam and treatment plan reviewed with   > Office visit in 3 weeks or prn for new or worsening symptoms.

## 2024-04-11 ENCOUNTER — NON-APPOINTMENT (OUTPATIENT)
Age: 54
End: 2024-04-11

## 2024-04-19 ENCOUNTER — NON-APPOINTMENT (OUTPATIENT)
Age: 54
End: 2024-04-19

## 2024-04-24 ENCOUNTER — APPOINTMENT (OUTPATIENT)
Dept: SURGERY | Facility: CLINIC | Age: 54
End: 2024-04-24

## 2024-04-24 VITALS
DIASTOLIC BLOOD PRESSURE: 85 MMHG | HEART RATE: 104 BPM | SYSTOLIC BLOOD PRESSURE: 131 MMHG | OXYGEN SATURATION: 100 % | BODY MASS INDEX: 39.27 KG/M2 | WEIGHT: 306 LBS | HEIGHT: 74 IN

## 2024-04-25 ENCOUNTER — NON-APPOINTMENT (OUTPATIENT)
Age: 54
End: 2024-04-25

## 2024-05-01 ENCOUNTER — RESULT REVIEW (OUTPATIENT)
Age: 54
End: 2024-05-01

## 2024-05-01 ENCOUNTER — APPOINTMENT (OUTPATIENT)
Dept: HEMATOLOGY ONCOLOGY | Facility: CLINIC | Age: 54
End: 2024-05-01
Payer: COMMERCIAL

## 2024-05-01 ENCOUNTER — APPOINTMENT (OUTPATIENT)
Dept: HEMATOLOGY ONCOLOGY | Facility: CLINIC | Age: 54
End: 2024-05-01

## 2024-05-01 VITALS
HEART RATE: 100 BPM | RESPIRATION RATE: 17 BRPM | WEIGHT: 307 LBS | OXYGEN SATURATION: 97 % | BODY MASS INDEX: 39.4 KG/M2 | SYSTOLIC BLOOD PRESSURE: 140 MMHG | HEIGHT: 74 IN | TEMPERATURE: 97 F | DIASTOLIC BLOOD PRESSURE: 80 MMHG

## 2024-05-01 PROCEDURE — G2211 COMPLEX E/M VISIT ADD ON: CPT

## 2024-05-01 PROCEDURE — 99215 OFFICE O/P EST HI 40 MIN: CPT

## 2024-05-01 RX ORDER — LOPERAMIDE HYDROCHLORIDE 2 MG/1
2 CAPSULE ORAL
Qty: 240 | Refills: 2 | Status: ACTIVE | COMMUNITY
Start: 2024-05-01 | End: 1900-01-01

## 2024-05-01 NOTE — ASSESSMENT
[FreeTextEntry1] : Mr.Eugene Myers is a 53 year old male with hx of ulcerative colitis with recurrent bowel obstructions due to proximal stricture which on initial biopsy around the rim showing 1 area with at least intramucosal adenocarcinoma. In the rectum, there was also an ulcerated area with focal high-grade dysplasia in the background of low-grade dysplasia. Repeat colonoscopy and MRI pelvis did not show disease in the rectum.  Now s/p robotic assisted right hemicolectomy for hepatic flexure mass consistent with stage IIIC colon cancer.  High risk Stage III (N2b disease) 8/23 LN positive + LVI, PNI Margins neg VAL  Discussed in detail the diagnosis, prognosis and risk of recurrence.  Recommend adjuvant chemotherapy with 6 months of FOLFOX. However, patient would like to defer port placement, therefore would have to be CAPEOX.  Again discussed that Oxaliplatin has vesicant properties, and the preferred method of administration is via mediport. He is again declining placement of a mediport at this time and understands the risks of peripheral administration. Discussed my concern with Xeloda given hx of UC and diarrhea, however patient prefers to try Xeloda.  Signatera 4/3/24: negative  Cycle1: 4/10/24 Cycle 2: 5/1/24. labs reviewed and ok to treat. Dose reduce to Xeloda 2000 mg BID 2/2 diarrhea   >CBC, CMP prior to treatments, CEA monthly > Xeloda 2000 mg qd Day 1-14 followed by 7 days off every 21 days >zofran prn for nausea  >imodium for diarrhea- sent to pharmacy.  Return on treatment day

## 2024-05-01 NOTE — HISTORY OF PRESENT ILLNESS
[Disease: _____________________] : Disease: [unfilled] [T: ___] : T[unfilled] [N: ___] : N[unfilled] [M: ___] : M[unfilled] [AJCC Stage: ____] : AJCC Stage: [unfilled] [de-identified] : Mr.Eugene Myers is a 53 year old male who presents to the office for r/o colon cancer.  In 1/2/2024, patient was admitted for severe abdominal pain and was found to have a bowel obstruction.  Colonoscopy showed a stricture with polypoid feature proximal colon-suspect malignant, and a 6mm sigmoid colon polyp. biopsy around the rim and rectal area showed 1 area with at least intramucosal adenocarcinoma. In the rectum, there was an ulcerated area with focal high-grade dysplasia in the background of low-grade dysplasia.   CT C/A/P 1/2/2024: obstructing/stricture ring lesion at the splenic flexure of the colon, most likely adenocarcinoma. No evidence of metastatic disease. Last colonoscopy 10/2023 was negative  Patient had an NG tube placed for decompression. In the hospital he saw a colorectal surgeon who recommended a total proctocolectomy with ileal pouch.  Here today with daughter and aunt. He feels much better since discharge. No longer has abdominal pain. Has seen surgery and is very hesitant to agree to total proctocolectomy.  PATHOLOGY at [facility]: At the site of the right rim of structure: the microscopic description showed: one fragment of inflammatory type polyp and one somewhat degenerated fragment of mucosa with intersecting glands composed of mitotically active irregular nuclei, concerning for at least intramucosal adenocarcinoma. These cells largely lack goblet cells and do not appear classically colonic or intestinal.  No lymphocascular invasion   s/p robotic assisted right hemicolectomy after repeat obstruction on 2/29/24.   Pathology showed Invasive adenocarcinoma, poorly differentiated, with focal mucinous features. 8/23 LN positive. VAL.   FINAL DIAGNOSIS  A. RIGHT COLON: -Invasive adenocarcinoma, poorly differentiated, with focal mucinous features, 3.2 cm in greatest diameter, extending through muscularis propria into pericolonic fat, with focal involvement of serosal surface -Resection margins negative for adenocarcinoma -Perineural invasion, lymphatic invasion and vascular (intramural) invasion are seen -Tumor Bud score = 12 (high) -Eight of twenty-three lymph nodes, positive for carcinoma (8/23)                              Health Maintenace:  Colonoscopy: 8 months before this instance: March 2023 - negative Prostate exam: 10 years ago  Family hx:  Mother: throat ca: 50s   Social hx:  Smoker: former smoker: 15 years 1.5 packs a day ETOH: none Illicit drug: none Work:     [de-identified] : adenocarcinoma [Therapy: ___] : Therapy: [unfilled] [Cycle: ___] : Cycle: [unfilled] [Day: ___] : Day: [unfilled] [de-identified] : Patient presents for cycle 1 CAPEOX. He has no complaints today. He had a CT C/A/P on 4/5/24 which is still pending. We discussed possible side effects, toxicities of chemotherapy including hand/foot syndrome, lowering of blood counts, cold sensitivity and possible laryngospasm with cold and pancytopenia.  We again reviewed that Oxaliplatin has vesicant properties, and the preferred method of administration is via mediport.  He is again declining placement of a mediport at this time  and understands the risks of peripheral administration.   He has occasional constipation.  He is healing well from surgery. Denies any abdominal pain or difficulty eating.  [0 - No Distress] : Distress Level: 0 [ECOG Performance Status: 0 - Fully active, able to carry on all pre-disease performance without restriction] : Performance Status: 0 - Fully active, able to carry on all pre-disease performance without restriction

## 2024-05-01 NOTE — REVIEW OF SYSTEMS
[Fatigue] : fatigue [Diarrhea: Grade 2 - Increase of 4 - 6 stools per day over baseline; moderate increase in ostomy output compared to baseline] : Diarrhea: Grade 2 - Increase of 4 - 6 stools per day over baseline; moderate increase in ostomy output compared to baseline [Negative] : Allergic/Immunologic [FreeTextEntry7] : diarrhea

## 2024-05-01 NOTE — HISTORY OF PRESENT ILLNESS
[T: ___] : T[unfilled] [N: ___] : N[unfilled] [M: ___] : M[unfilled] [AJCC Stage: ____] : AJCC Stage: [unfilled] [de-identified] : Mr.Eugene Myers is a 53 year old male who presents to the office for r/o colon cancer.  In 1/2/2024, patient was admitted for severe abdominal pain and was found to have a bowel obstruction.  Colonoscopy showed a stricture with polypoid feature proximal colon-suspect malignant, and a 6mm sigmoid colon polyp. biopsy around the rim and rectal area showed 1 area with at least intramucosal adenocarcinoma. In the rectum, there was an ulcerated area with focal high-grade dysplasia in the background of low-grade dysplasia.   CT C/A/P 1/2/2024: obstructing/stricture ring lesion at the splenic flexure of the colon, most likely adenocarcinoma. No evidence of metastatic disease. Last colonoscopy 10/2023 was negative  Patient had an NG tube placed for decompression. In the hospital he saw a colorectal surgeon who recommended a total proctocolectomy with ileal pouch.  Here today with daughter and aunt. He feels much better since discharge. No longer has abdominal pain. Has seen surgery and is very hesitant to agree to total proctocolectomy.  PATHOLOGY at [facility]: At the site of the right rim of structure: the microscopic description showed: one fragment of inflammatory type polyp and one somewhat degenerated fragment of mucosa with intersecting glands composed of mitotically active irregular nuclei, concerning for at least intramucosal adenocarcinoma. These cells largely lack goblet cells and do not appear classically colonic or intestinal.  No lymphocascular invasion   s/p robotic assisted right hemicolectomy after repeat obstruction on 2/29/24.   Pathology showed Invasive adenocarcinoma, poorly differentiated, with focal mucinous features. 8/23 LN positive. VAL.   FINAL DIAGNOSIS  A. RIGHT COLON: -Invasive adenocarcinoma, poorly differentiated, with focal mucinous features, 3.2 cm in greatest diameter, extending through muscularis propria into pericolonic fat, with focal involvement of serosal surface -Resection margins negative for adenocarcinoma -Perineural invasion, lymphatic invasion and vascular (intramural) invasion are seen -Tumor Bud score = 12 (high) -Eight of twenty-three lymph nodes, positive for carcinoma (8/23)                              Health Maintenace:  Colonoscopy: 8 months before this instance: March 2023 - negative Prostate exam: 10 years ago  Family hx:  Mother: throat ca: 50s   Social hx:  Smoker: former smoker: 15 years 1.5 packs a day ETOH: none Illicit drug: none Work:     [de-identified] : here for cycle 2 of CAPEOX States he felt fatigued and had significant diarrhea (5-7 times daily) with cycle 1. Denies any blood in stool or decreased appetite. No fevers, chills or sweats Has cold sensitivity but denies neuropathy  [ECOG Performance Status: 1 - Restricted in physically strenuous activity but ambulatory and able to carry out work of a light or sedentary nature] : Performance Status: 1 - Restricted in physically strenuous activity but ambulatory and able to carry out work of a light or sedentary nature, e.g., light house work, office work

## 2024-05-01 NOTE — PHYSICAL EXAM
[Fully active, able to carry on all pre-disease performance without restriction] : Status 0 - Fully active, able to carry on all pre-disease performance without restriction [Obese] : obese [Normal] : normal appearance, no rash, nodules, vesicles, ulcers, erythema [de-identified] : umbilical hernia. well healing laparoscopic entry points and suprapubic scar.

## 2024-05-01 NOTE — PHYSICAL EXAM
[Restricted in physically strenuous activity but ambulatory and able to carry out work of a light or sedentary nature] : Status 1- Restricted in physically strenuous activity but ambulatory and able to carry out work of a light or sedentary nature, e.g., light house work, office work [Obese] : obese [Normal] : normal appearance, no rash, nodules, vesicles, ulcers, erythema [de-identified] : umbilical hernia. well healing laparoscopic entry points and suprapubic scar.

## 2024-05-02 RX ORDER — ONDANSETRON 8 MG/1
8 TABLET, ORALLY DISINTEGRATING ORAL
Qty: 90 | Refills: 3 | Status: ACTIVE | COMMUNITY
Start: 2024-05-02 | End: 2024-08-30

## 2024-05-15 ENCOUNTER — NON-APPOINTMENT (OUTPATIENT)
Age: 54
End: 2024-05-15

## 2024-05-16 ENCOUNTER — NON-APPOINTMENT (OUTPATIENT)
Age: 54
End: 2024-05-16

## 2024-05-22 ENCOUNTER — RESULT REVIEW (OUTPATIENT)
Age: 54
End: 2024-05-22

## 2024-05-22 ENCOUNTER — APPOINTMENT (OUTPATIENT)
Dept: HEMATOLOGY ONCOLOGY | Facility: CLINIC | Age: 54
End: 2024-05-22
Payer: COMMERCIAL

## 2024-05-22 VITALS
RESPIRATION RATE: 16 BRPM | SYSTOLIC BLOOD PRESSURE: 133 MMHG | WEIGHT: 306 LBS | OXYGEN SATURATION: 96 % | TEMPERATURE: 97.7 F | HEIGHT: 74 IN | HEART RATE: 109 BPM | DIASTOLIC BLOOD PRESSURE: 79 MMHG | BODY MASS INDEX: 39.27 KG/M2

## 2024-05-22 DIAGNOSIS — D49.0 NEOPLASM OF UNSPECIFIED BEHAVIOR OF DIGESTIVE SYSTEM: ICD-10-CM

## 2024-05-22 DIAGNOSIS — R11.0 NAUSEA: ICD-10-CM

## 2024-05-22 DIAGNOSIS — T45.1X5A NAUSEA: ICD-10-CM

## 2024-05-22 DIAGNOSIS — C18.3 MALIGNANT NEOPLASM OF HEPATIC FLEXURE: ICD-10-CM

## 2024-05-22 PROCEDURE — G2211 COMPLEX E/M VISIT ADD ON: CPT

## 2024-05-22 PROCEDURE — 99214 OFFICE O/P EST MOD 30 MIN: CPT

## 2024-05-22 RX ORDER — CAPECITABINE 500 MG/1
500 TABLET ORAL
Qty: 140 | Refills: 2 | Status: DISCONTINUED | COMMUNITY
Start: 2024-04-03 | End: 2024-05-22

## 2024-05-22 NOTE — PHYSICAL EXAM
[Restricted in physically strenuous activity but ambulatory and able to carry out work of a light or sedentary nature] : Status 1- Restricted in physically strenuous activity but ambulatory and able to carry out work of a light or sedentary nature, e.g., light house work, office work [Obese] : obese [Normal] : normal appearance, no rash, nodules, vesicles, ulcers, erythema [de-identified] : umbilical hernia. well healing laparoscopic entry points and suprapubic scar.

## 2024-05-22 NOTE — ASSESSMENT
[FreeTextEntry1] :  hx of ulcerative colitis with recurrent bowel obstructions due to proximal stricture which on initial biopsy around the rim showing 1 area with at least intramucosal adenocarcinoma.   In the rectum, there was also an ulcerated area with focal high-grade dysplasia in the background of low-grade dysplasia. Repeat colonoscopy and MRI pelvis did not show disease in the rectum.  Now s/p robotic assisted right hemicolectomy for hepatic flexure mass consistent with stage IIIC colon cancer.  High risk Stage III (N2b disease) 8/23 LN positive + LVI, PNI Margins neg VAL  Adjuvant chemotherapy with 6 months of FOLFOX. However, patient would like to defer port placement, therefore would have to be CAPEOX.  Again discussed that Oxaliplatin has vesicant properties, and the preferred method of administration is via mediport. He is again declining placement of a mediport at this time and understands the risks of peripheral administration. Discussed my concern with Xeloda given hx of UC and diarrhea, however patient prefers to try Xeloda.  Signatera 4/3/24: negative  Cycle1: 4/10/24 Cycle 2: 5/1/24. labs reviewed and ok to treat.  Cycle 3: 5/22/24: labs reviewed. -Dose reduce to Xeloda 2000 mg BID 2/2 diarrhea  >CBC, CMP prior to treatments, CEA monthly > Xeloda 2000 mg qd Day 1-14 followed by 7 days off every 21 days >zofran prn for nausea  >imodium for diarrhea- sent to pharmacy. >follow up with GI  Return in 2 weeks

## 2024-05-22 NOTE — HISTORY OF PRESENT ILLNESS
[T: ___] : T[unfilled] [N: ___] : N[unfilled] [M: ___] : M[unfilled] [AJCC Stage: ____] : AJCC Stage: [unfilled] [ECOG Performance Status: 1 - Restricted in physically strenuous activity but ambulatory and able to carry out work of a light or sedentary nature] : Performance Status: 1 - Restricted in physically strenuous activity but ambulatory and able to carry out work of a light or sedentary nature, e.g., light house work, office work [de-identified] : Mr. Teddy Myers is a 53 year old male who presents to the office for r/o colon cancer.  In 1/2/2024, patient was admitted for severe abdominal pain and was found to have a bowel obstruction.  Colonoscopy showed a stricture with polypoid feature proximal colon-suspect malignant, and a 6mm sigmoid colon polyp. biopsy around the rim and rectal area showed 1 area with at least intramucosal adenocarcinoma. In the rectum, there was an ulcerated area with focal high-grade dysplasia in the background of low-grade dysplasia.   CT C/A/P 1/2/2024: obstructing/stricture ring lesion at the splenic flexure of the colon, most likely adenocarcinoma. No evidence of metastatic disease. Last colonoscopy 10/2023 was negative  Patient had an NG tube placed for decompression. In the hospital he saw a colorectal surgeon who recommended a total proctocolectomy with ileal pouch.  Here today with daughter and aunt. He feels much better since discharge. No longer has abdominal pain. Has seen surgery and is very hesitant to agree to total proctocolectomy.  PATHOLOGY at [facility]: At the site of the right rim of structure: the microscopic description showed: one fragment of inflammatory type polyp and one somewhat degenerated fragment of mucosa with intersecting glands composed of mitotically active irregular nuclei, concerning for at least intramucosal adenocarcinoma. These cells largely lack goblet cells and do not appear classically colonic or intestinal.  No lymphocascular invasion   s/p robotic assisted right hemicolectomy after repeat obstruction on 2/29/24.   Pathology showed Invasive adenocarcinoma, poorly differentiated, with focal mucinous features. 8/23 LN positive. VAL.   FINAL DIAGNOSIS  A. RIGHT COLON: -Invasive adenocarcinoma, poorly differentiated, with focal mucinous features, 3.2 cm in greatest diameter, extending through muscularis propria into pericolonic fat, with focal involvement of serosal surface -Resection margins negative for adenocarcinoma -Perineural invasion, lymphatic invasion and vascular (intramural) invasion are seen -Tumor Bud score = 12 (high) -Eight of twenty-three lymph nodes, positive for carcinoma (8/23)                              Health Maintenace:  Colonoscopy: 8 months before this instance: March 2023 - negative Prostate exam: 10 years ago  Family hx:  Mother: throat ca: 50s   Social hx:  Smoker: former smoker: 15 years 1.5 packs a day ETOH: none Illicit drug: none Work:     [de-identified] : here for cycle 3 of CAPEOX Did not lower his dose of Xeloda. Wanted to try to continue at 2500mg. Continued to have diarrhea (5-7 times daily).  Denies any blood in stool or decreased appetite. No fevers, chills or sweats Has cold sensitivity but denies neuropathy +skin peeling at tips of fingers.

## 2024-05-22 NOTE — REVIEW OF SYSTEMS
[Fatigue] : fatigue [Diarrhea: Grade 2 - Increase of 4 - 6 stools per day over baseline; moderate increase in ostomy output compared to baseline] : Diarrhea: Grade 2 - Increase of 4 - 6 stools per day over baseline; moderate increase in ostomy output compared to baseline [Negative] : Allergic/Immunologic [FreeTextEntry7] : diarrhea [de-identified] : mild peeling at tips of 2 fingers

## 2024-06-04 ENCOUNTER — NON-APPOINTMENT (OUTPATIENT)
Age: 54
End: 2024-06-04

## 2024-06-04 RX ORDER — CAPECITABINE 500 MG/1
500 TABLET ORAL
Qty: 112 | Refills: 0 | Status: ACTIVE | COMMUNITY
Start: 2024-05-22 | End: 1900-01-01

## 2024-06-05 ENCOUNTER — APPOINTMENT (OUTPATIENT)
Dept: HEMATOLOGY ONCOLOGY | Facility: CLINIC | Age: 54
End: 2024-06-05

## 2024-06-05 ENCOUNTER — RESULT REVIEW (OUTPATIENT)
Age: 54
End: 2024-06-05

## 2024-06-05 VITALS
TEMPERATURE: 97.4 F | OXYGEN SATURATION: 95 % | SYSTOLIC BLOOD PRESSURE: 134 MMHG | RESPIRATION RATE: 16 BRPM | BODY MASS INDEX: 39.01 KG/M2 | WEIGHT: 304 LBS | HEART RATE: 104 BPM | DIASTOLIC BLOOD PRESSURE: 74 MMHG | HEIGHT: 74 IN

## 2024-06-05 RX ORDER — DIPHENOXYLATE HYDROCHLORIDE AND ATROPINE SULFATE 2.5; .025 MG/1; MG/1
2.5-0.025 TABLET ORAL
Qty: 40 | Refills: 0 | Status: ACTIVE | COMMUNITY
Start: 2024-06-05 | End: 1900-01-01

## 2024-06-05 NOTE — REVIEW OF SYSTEMS
[Fatigue] : fatigue [Diarrhea: Grade 2 - Increase of 4 - 6 stools per day over baseline; moderate increase in ostomy output compared to baseline] : Diarrhea: Grade 2 - Increase of 4 - 6 stools per day over baseline; moderate increase in ostomy output compared to baseline [Negative] : Allergic/Immunologic [FreeTextEntry7] : diarrhea [de-identified] : mild peeling at tips of 2 fingers

## 2024-06-05 NOTE — PHYSICAL EXAM
[Restricted in physically strenuous activity but ambulatory and able to carry out work of a light or sedentary nature] : Status 1- Restricted in physically strenuous activity but ambulatory and able to carry out work of a light or sedentary nature, e.g., light house work, office work [Obese] : obese [Normal] : normal appearance, no rash, nodules, vesicles, ulcers, erythema [de-identified] : umbilical hernia. well healing laparoscopic entry points and suprapubic scar.

## 2024-06-05 NOTE — HISTORY OF PRESENT ILLNESS
[T: ___] : T[unfilled] [N: ___] : N[unfilled] [M: ___] : M[unfilled] [AJCC Stage: ____] : AJCC Stage: [unfilled] [ECOG Performance Status: 1 - Restricted in physically strenuous activity but ambulatory and able to carry out work of a light or sedentary nature] : Performance Status: 1 - Restricted in physically strenuous activity but ambulatory and able to carry out work of a light or sedentary nature, e.g., light house work, office work [de-identified] : Mr. Teddy Myers is a 53 year old male who presents to the office for r/o colon cancer.  In 1/2/2024, patient was admitted for severe abdominal pain and was found to have a bowel obstruction.  Colonoscopy showed a stricture with polypoid feature proximal colon-suspect malignant, and a 6mm sigmoid colon polyp. biopsy around the rim and rectal area showed 1 area with at least intramucosal adenocarcinoma. In the rectum, there was an ulcerated area with focal high-grade dysplasia in the background of low-grade dysplasia.   CT C/A/P 1/2/2024: obstructing/stricture ring lesion at the splenic flexure of the colon, most likely adenocarcinoma. No evidence of metastatic disease. Last colonoscopy 10/2023 was negative  Patient had an NG tube placed for decompression. In the hospital he saw a colorectal surgeon who recommended a total proctocolectomy with ileal pouch.  Here today with daughter and aunt. He feels much better since discharge. No longer has abdominal pain. Has seen surgery and is very hesitant to agree to total proctocolectomy.  PATHOLOGY at [facility]: At the site of the right rim of structure: the microscopic description showed: one fragment of inflammatory type polyp and one somewhat degenerated fragment of mucosa with intersecting glands composed of mitotically active irregular nuclei, concerning for at least intramucosal adenocarcinoma. These cells largely lack goblet cells and do not appear classically colonic or intestinal.  No lymphocascular invasion   s/p robotic assisted right hemicolectomy after repeat obstruction on 2/29/24.   Pathology showed Invasive adenocarcinoma, poorly differentiated, with focal mucinous features. 8/23 LN positive. VAL.   FINAL DIAGNOSIS  A. RIGHT COLON: -Invasive adenocarcinoma, poorly differentiated, with focal mucinous features, 3.2 cm in greatest diameter, extending through muscularis propria into pericolonic fat, with focal involvement of serosal surface -Resection margins negative for adenocarcinoma -Perineural invasion, lymphatic invasion and vascular (intramural) invasion are seen -Tumor Bud score = 12 (high) -Eight of twenty-three lymph nodes, positive for carcinoma (8/23)                              Health Maintenace:  Colonoscopy: 8 months before this instance: March 2023 - negative Prostate exam: 10 years ago  Family hx:  Mother: throat ca: 50s   Social hx:  Smoker: former smoker: 15 years 1.5 packs a day ETOH: none Illicit drug: none Work:     [de-identified] : here for cycle 3 of CAPEOX Did not lower his dose of Xeloda. Wanted to try to continue at 2500mg. Continued to have diarrhea (5-7 times daily).  Denies any blood in stool or decreased appetite. No fevers, chills or sweats Has cold sensitivity but denies neuropathy +skin peeling at tips of fingers.

## 2024-06-06 ENCOUNTER — APPOINTMENT (OUTPATIENT)
Dept: GASTROENTEROLOGY | Facility: CLINIC | Age: 54
End: 2024-06-06
Payer: COMMERCIAL

## 2024-06-06 VITALS
HEIGHT: 74 IN | SYSTOLIC BLOOD PRESSURE: 120 MMHG | WEIGHT: 300 LBS | BODY MASS INDEX: 38.5 KG/M2 | HEART RATE: 99 BPM | DIASTOLIC BLOOD PRESSURE: 80 MMHG | OXYGEN SATURATION: 99 %

## 2024-06-06 DIAGNOSIS — K51.018 ULCERATIVE (CHRONIC) PANCOLITIS WITH OTHER COMPLICATION: ICD-10-CM

## 2024-06-06 DIAGNOSIS — R19.7 DIARRHEA, UNSPECIFIED: ICD-10-CM

## 2024-06-06 PROCEDURE — 99214 OFFICE O/P EST MOD 30 MIN: CPT

## 2024-06-06 PROCEDURE — 99204 OFFICE O/P NEW MOD 45 MIN: CPT

## 2024-06-06 RX ORDER — SODIUM SULFATE, POTASSIUM SULFATE AND MAGNESIUM SULFATE 1.6; 3.13; 17.5 G/177ML; G/177ML; G/177ML
17.5-3.13-1.6 SOLUTION ORAL
Qty: 1 | Refills: 0 | Status: ACTIVE | COMMUNITY
Start: 2024-06-06 | End: 1900-01-01

## 2024-06-07 NOTE — ASSESSMENT
[FreeTextEntry1] : Will plan on a colonoscopy for UC, recent colon cancer. Unclear if his symptoms are a side effect of chemo or due to his UC. In addition, will need extensive biopsies given his prior abnormal tissue on colonoscopies and his recent colon cancer.  Explained risks/benefits/alternatives including not limited to bleeding, infection, perforation, missed lesions, anesthesia complications.  Patient understands and agrees, all questions answered.  Will use a split dose Suprep prep with clears the day prior.  Continue Humira. Continue Imodium. Onc f/u.

## 2024-06-07 NOTE — HISTORY OF PRESENT ILLNESS
[FreeTextEntry1] : Mr. Myers is a 53M h/o ulcerative colitis, HTN, colon cancer s/p resection, HTN, obesity, hypothyroid who presents to establish care post-hospitalization.  He initially presented to Dr. Lyons 1/18/24 after an admission to [facility] for an LBO. He underwent a CT A/P showing a stricture at the hepatic flexure. He underwent a colonoscopy while hospitalized showing invasive adenocarcinoma at the hepatic flexure, in addition to high grade dysplasia in the rectum.  He was previously diagnosed with UC at least 6 years prior, was following closely with another gastroenterologist, was managed on weekly Humira and 6-MP previously. Stated his most recent prior colonoscopy was in 2023, no mass seen at that time.  He presented to Dr. Lyons for a second opinion due to losing confidence in his other physicians. He was thereafter scheduled for a colonoscopy with myself on 2/12/24 revealing an obstructing mass in the transverse colon, in addition to abnormal tissue in the descending and sigmoid colon (hyperplastic polyp). He underwent a right hemicolectomy on 2/29 (refused total proctocolectomy despite his UC and abnormal tissue on prior biopsies).  He has since established with Dr. Ferrera and has been receiving chemo.  Has refused a Mediport placement and has not been on optimal oncology regimens as a result, has been taking Xeloda despite concerns from oncology given the known side effect of diarrhea, has had diarrhea. Continues using his Humira, has stopped his 6-MP.  He now states he has continued to have 8 or so loose bowel movements daily with significant urgency, no blood, some mucus. Weight is stable. Strength is adequate. Has been using Imodium for diarrhea.

## 2024-06-10 RX ORDER — ADALIMUMAB 40MG/0.4ML
40 KIT SUBCUTANEOUS
Qty: 2 | Refills: 3 | Status: ACTIVE | COMMUNITY
Start: 2024-06-07 | End: 1900-01-01

## 2024-06-11 RX ORDER — ADALIMUMAB 40MG/0.4ML
40 KIT SUBCUTANEOUS
Qty: 4 | Refills: 6 | Status: ACTIVE | COMMUNITY
Start: 2024-06-10 | End: 1900-01-01

## 2024-06-14 ENCOUNTER — NON-APPOINTMENT (OUTPATIENT)
Age: 54
End: 2024-06-14

## 2024-06-24 LAB
CDIFF BY PCR: NOT DETECTED
GI PCR PANEL: ABNORMAL
NOROVIRUS GI/GII: ABNORMAL

## 2024-06-27 LAB — CALPROTECTIN FECAL: 179 UG/G

## 2024-06-28 ENCOUNTER — APPOINTMENT (OUTPATIENT)
Dept: GASTROENTEROLOGY | Facility: HOSPITAL | Age: 54
End: 2024-06-28

## 2024-07-05 ENCOUNTER — APPOINTMENT (OUTPATIENT)
Dept: HEMATOLOGY ONCOLOGY | Facility: CLINIC | Age: 54
End: 2024-07-05
Payer: COMMERCIAL

## 2024-07-05 ENCOUNTER — RESULT REVIEW (OUTPATIENT)
Age: 54
End: 2024-07-05

## 2024-07-05 VITALS
SYSTOLIC BLOOD PRESSURE: 124 MMHG | HEART RATE: 97 BPM | TEMPERATURE: 97.6 F | HEIGHT: 74 IN | RESPIRATION RATE: 16 BRPM | DIASTOLIC BLOOD PRESSURE: 77 MMHG | BODY MASS INDEX: 38.77 KG/M2 | OXYGEN SATURATION: 97 % | WEIGHT: 302.13 LBS

## 2024-07-05 DIAGNOSIS — K51.018 ULCERATIVE (CHRONIC) PANCOLITIS WITH OTHER COMPLICATION: ICD-10-CM

## 2024-07-05 DIAGNOSIS — R19.7 DIARRHEA, UNSPECIFIED: ICD-10-CM

## 2024-07-05 DIAGNOSIS — C18.3 MALIGNANT NEOPLASM OF HEPATIC FLEXURE: ICD-10-CM

## 2024-07-05 PROCEDURE — 99215 OFFICE O/P EST HI 40 MIN: CPT

## 2024-07-05 PROCEDURE — G2211 COMPLEX E/M VISIT ADD ON: CPT

## 2024-07-05 RX ORDER — CAPECITABINE 500 MG/1
500 TABLET ORAL TWICE DAILY
Qty: 84 | Refills: 0 | Status: ACTIVE | COMMUNITY
Start: 2024-07-05 | End: 1900-01-01

## 2024-07-18 RX ORDER — HYDROCORTISONE 100 MG/60ML
100 ENEMA RECTAL DAILY
Qty: 4 | Refills: 1 | Status: ACTIVE | COMMUNITY
Start: 2024-07-18 | End: 1900-01-01

## 2024-07-24 ENCOUNTER — RESULT REVIEW (OUTPATIENT)
Age: 54
End: 2024-07-24

## 2024-07-24 ENCOUNTER — APPOINTMENT (OUTPATIENT)
Dept: HEMATOLOGY ONCOLOGY | Facility: CLINIC | Age: 54
End: 2024-07-24
Payer: COMMERCIAL

## 2024-07-24 ENCOUNTER — NON-APPOINTMENT (OUTPATIENT)
Age: 54
End: 2024-07-24

## 2024-07-24 VITALS
HEART RATE: 90 BPM | SYSTOLIC BLOOD PRESSURE: 124 MMHG | HEIGHT: 74 IN | BODY MASS INDEX: 38.89 KG/M2 | TEMPERATURE: 97.3 F | RESPIRATION RATE: 16 BRPM | DIASTOLIC BLOOD PRESSURE: 85 MMHG | OXYGEN SATURATION: 99 % | WEIGHT: 303 LBS

## 2024-07-24 DIAGNOSIS — Z00.00 ENCOUNTER FOR GENERAL ADULT MEDICAL EXAMINATION W/OUT ABNORMAL FINDINGS: ICD-10-CM

## 2024-07-24 DIAGNOSIS — R19.7 DIARRHEA, UNSPECIFIED: ICD-10-CM

## 2024-07-24 DIAGNOSIS — C18.3 MALIGNANT NEOPLASM OF HEPATIC FLEXURE: ICD-10-CM

## 2024-07-24 PROCEDURE — G2211 COMPLEX E/M VISIT ADD ON: CPT

## 2024-07-24 PROCEDURE — 99215 OFFICE O/P EST HI 40 MIN: CPT

## 2024-07-25 NOTE — ASSESSMENT
[FreeTextEntry1] : Hx of ulcerative colitis with recurrent bowel obstructions due to proximal stricture which on initial biopsy around the rim showing 1 area with at least intramucosal adenocarcinoma.   In the rectum, there was also an ulcerated area with focal high-grade dysplasia in the background of low-grade dysplasia. Repeat colonoscopy and MRI pelvis did not show disease in the rectum.  Now s/p robotic assisted right hemicolectomy for hepatic flexure mass consistent with stage IIIC colon cancer.  High risk Stage III (N2b disease) 8/23 LN positive + LVI, PNI Margins neg VAL  Adjuvant chemotherapy with 6 months of FOLFOX. However, patient would like to defer port placement, therefore would have to be CAPEOX.   Discussed multiple times that Oxaliplatin has vesicant properties, and the preferred method of administration is via mediport. He is again declining placement of a mediport at this time and understands the risks of peripheral administration.  Discussed my concern with Xeloda given hx of UC and diarrhea, however patient prefers to try Xeloda.  Discussed importance of taking Xeloda as directed. Patient has been increasing his dose on some days (has extra pills). Notices he has worse diarrhea when he does this.  Re-discussed possibility of port- refused.  Signatera 4/3/24: negative  Cycle1: 4/10/24 Cycle 2: 5/1/24. labs reviewed and ok to treat.  Cycle 3: 5/22/24: labs reviewed. -Dose reduce to Xeloda 2000 mg BID 2/2 diarrhea Cycle 4:: 6/13/24: continues at 2000 mg  BID due to diarrhea Cycle 5: 7/5/24: will decrease Xeloda to 1500 mg BID due to diarrhea and colonoscopy results Cycle 6: 7/24/24: continues on Xeloda 1500 mg BID  >CBC, CMP prior to treatments, CEA monthly >Xeloda 1500 mg qd Day 1-14 followed by 7 days off every 21 days >zofran prn for nausea  >imodium for diarrhea- sent to pharmacy. >follow up with GI   Return in 3 weeks

## 2024-07-25 NOTE — REVIEW OF SYSTEMS
[Fatigue] : fatigue [Diarrhea: Grade 2 - Increase of 4 - 6 stools per day over baseline; moderate increase in ostomy output compared to baseline] : Diarrhea: Grade 2 - Increase of 4 - 6 stools per day over baseline; moderate increase in ostomy output compared to baseline [Negative] : Allergic/Immunologic [FreeTextEntry7] : diarrhea [de-identified] : mild peeling at tips of 2 fingers

## 2024-07-25 NOTE — HISTORY OF PRESENT ILLNESS
[T: ___] : T[unfilled] [N: ___] : N[unfilled] [M: ___] : M[unfilled] [AJCC Stage: ____] : AJCC Stage: [unfilled] [Cycle: ___] : Cycle: [unfilled] [ECOG Performance Status: 1 - Restricted in physically strenuous activity but ambulatory and able to carry out work of a light or sedentary nature] : Performance Status: 1 - Restricted in physically strenuous activity but ambulatory and able to carry out work of a light or sedentary nature, e.g., light house work, office work [de-identified] : Mr. Teddy Myers is a 53 year old male who presents to the office for r/o colon cancer.  In 1/2/2024, patient was admitted for severe abdominal pain and was found to have a bowel obstruction.  Colonoscopy showed a stricture with polypoid feature proximal colon-suspect malignant, and a 6mm sigmoid colon polyp. biopsy around the rim and rectal area showed 1 area with at least intramucosal adenocarcinoma. In the rectum, there was an ulcerated area with focal high-grade dysplasia in the background of low-grade dysplasia.   CT C/A/P 1/2/2024: obstructing/stricture ring lesion at the splenic flexure of the colon, most likely adenocarcinoma. No evidence of metastatic disease. Last colonoscopy 10/2023 was negative  Patient had an NG tube placed for decompression. In the hospital he saw a colorectal surgeon who recommended a total proctocolectomy with ileal pouch.  Here today with daughter and aunt. He feels much better since discharge. No longer has abdominal pain. Has seen surgery and is very hesitant to agree to total proctocolectomy.  PATHOLOGY at [facility]: At the site of the right rim of structure: the microscopic description showed: one fragment of inflammatory type polyp and one somewhat degenerated fragment of mucosa with intersecting glands composed of mitotically active irregular nuclei, concerning for at least intramucosal adenocarcinoma. These cells largely lack goblet cells and do not appear classically colonic or intestinal.  No lymphocascular invasion   s/p robotic assisted right hemicolectomy after repeat obstruction on 2/29/24.   Pathology showed Invasive adenocarcinoma, poorly differentiated, with focal mucinous features. 8/23 LN positive. VAL.   FINAL DIAGNOSIS  A. RIGHT COLON: -Invasive adenocarcinoma, poorly differentiated, with focal mucinous features, 3.2 cm in greatest diameter, extending through muscularis propria into pericolonic fat, with focal involvement of serosal surface -Resection margins negative for adenocarcinoma -Perineural invasion, lymphatic invasion and vascular (intramural) invasion are seen -Tumor Bud score = 12 (high) -Eight of twenty-three lymph nodes, positive for carcinoma (8/23)                              Health Maintenace:  Colonoscopy: 8 months before this instance: March 2023 - negative Prostate exam: 10 years ago  Family hx:  Mother: throat ca: 50s   Social hx:  Smoker: former smoker: 15 years 1.5 packs a day ETOH: none Illicit drug: none Work:     [de-identified] : here for cycle 6 of CAPEOX Continues on lowered dose Xeloda. Notices that his diarrhea worsens on the increased dose Mildly improved diarrhea On steroid suppositories for rectal inflammation Denies any blood in stool or decreased appetite. No fevers, chills or sweats Has cold sensitivity but denies neuropathy, +skin peeling at tips of fingers.

## 2024-07-25 NOTE — PHYSICAL EXAM
[Restricted in physically strenuous activity but ambulatory and able to carry out work of a light or sedentary nature] : Status 1- Restricted in physically strenuous activity but ambulatory and able to carry out work of a light or sedentary nature, e.g., light house work, office work [Obese] : obese [Normal] : normal appearance, no rash, nodules, vesicles, ulcers, erythema [de-identified] : umbilical hernia. well healing laparoscopic entry points and suprapubic scar.

## 2024-07-25 NOTE — REVIEW OF SYSTEMS
[Fatigue] : fatigue [Diarrhea: Grade 2 - Increase of 4 - 6 stools per day over baseline; moderate increase in ostomy output compared to baseline] : Diarrhea: Grade 2 - Increase of 4 - 6 stools per day over baseline; moderate increase in ostomy output compared to baseline [Negative] : Allergic/Immunologic [FreeTextEntry7] : diarrhea [de-identified] : mild peeling at tips of 2 fingers

## 2024-07-25 NOTE — PHYSICAL EXAM
[Restricted in physically strenuous activity but ambulatory and able to carry out work of a light or sedentary nature] : Status 1- Restricted in physically strenuous activity but ambulatory and able to carry out work of a light or sedentary nature, e.g., light house work, office work [Obese] : obese [Normal] : normal appearance, no rash, nodules, vesicles, ulcers, erythema [de-identified] : umbilical hernia. well healing laparoscopic entry points and suprapubic scar.

## 2024-07-25 NOTE — HISTORY OF PRESENT ILLNESS
[T: ___] : T[unfilled] [N: ___] : N[unfilled] [M: ___] : M[unfilled] [AJCC Stage: ____] : AJCC Stage: [unfilled] [Cycle: ___] : Cycle: [unfilled] [ECOG Performance Status: 1 - Restricted in physically strenuous activity but ambulatory and able to carry out work of a light or sedentary nature] : Performance Status: 1 - Restricted in physically strenuous activity but ambulatory and able to carry out work of a light or sedentary nature, e.g., light house work, office work [de-identified] : Mr. Teddy Myers is a 53 year old male who presents to the office for r/o colon cancer.  In 1/2/2024, patient was admitted for severe abdominal pain and was found to have a bowel obstruction.  Colonoscopy showed a stricture with polypoid feature proximal colon-suspect malignant, and a 6mm sigmoid colon polyp. biopsy around the rim and rectal area showed 1 area with at least intramucosal adenocarcinoma. In the rectum, there was an ulcerated area with focal high-grade dysplasia in the background of low-grade dysplasia.   CT C/A/P 1/2/2024: obstructing/stricture ring lesion at the splenic flexure of the colon, most likely adenocarcinoma. No evidence of metastatic disease. Last colonoscopy 10/2023 was negative  Patient had an NG tube placed for decompression. In the hospital he saw a colorectal surgeon who recommended a total proctocolectomy with ileal pouch.  Here today with daughter and aunt. He feels much better since discharge. No longer has abdominal pain. Has seen surgery and is very hesitant to agree to total proctocolectomy.  PATHOLOGY at [facility]: At the site of the right rim of structure: the microscopic description showed: one fragment of inflammatory type polyp and one somewhat degenerated fragment of mucosa with intersecting glands composed of mitotically active irregular nuclei, concerning for at least intramucosal adenocarcinoma. These cells largely lack goblet cells and do not appear classically colonic or intestinal.  No lymphocascular invasion   s/p robotic assisted right hemicolectomy after repeat obstruction on 2/29/24.   Pathology showed Invasive adenocarcinoma, poorly differentiated, with focal mucinous features. 8/23 LN positive. VAL.   FINAL DIAGNOSIS  A. RIGHT COLON: -Invasive adenocarcinoma, poorly differentiated, with focal mucinous features, 3.2 cm in greatest diameter, extending through muscularis propria into pericolonic fat, with focal involvement of serosal surface -Resection margins negative for adenocarcinoma -Perineural invasion, lymphatic invasion and vascular (intramural) invasion are seen -Tumor Bud score = 12 (high) -Eight of twenty-three lymph nodes, positive for carcinoma (8/23)                              Health Maintenace:  Colonoscopy: 8 months before this instance: March 2023 - negative Prostate exam: 10 years ago  Family hx:  Mother: throat ca: 50s   Social hx:  Smoker: former smoker: 15 years 1.5 packs a day ETOH: none Illicit drug: none Work:     [de-identified] : here for cycle 6 of CAPEOX Continues on lowered dose Xeloda. Notices that his diarrhea worsens on the increased dose Mildly improved diarrhea On steroid suppositories for rectal inflammation Denies any blood in stool or decreased appetite. No fevers, chills or sweats Has cold sensitivity but denies neuropathy, +skin peeling at tips of fingers.

## 2024-07-29 ENCOUNTER — NON-APPOINTMENT (OUTPATIENT)
Age: 54
End: 2024-07-29

## 2024-08-14 ENCOUNTER — RESULT REVIEW (OUTPATIENT)
Age: 54
End: 2024-08-14

## 2024-08-14 ENCOUNTER — APPOINTMENT (OUTPATIENT)
Dept: HEMATOLOGY ONCOLOGY | Facility: CLINIC | Age: 54
End: 2024-08-14
Payer: COMMERCIAL

## 2024-08-14 VITALS
BODY MASS INDEX: 38.89 KG/M2 | WEIGHT: 303.06 LBS | TEMPERATURE: 97.3 F | OXYGEN SATURATION: 99 % | SYSTOLIC BLOOD PRESSURE: 126 MMHG | RESPIRATION RATE: 16 BRPM | DIASTOLIC BLOOD PRESSURE: 80 MMHG | HEIGHT: 74 IN | HEART RATE: 88 BPM

## 2024-08-14 DIAGNOSIS — K51.018 ULCERATIVE (CHRONIC) PANCOLITIS WITH OTHER COMPLICATION: ICD-10-CM

## 2024-08-14 DIAGNOSIS — C18.3 MALIGNANT NEOPLASM OF HEPATIC FLEXURE: ICD-10-CM

## 2024-08-14 DIAGNOSIS — R19.7 DIARRHEA, UNSPECIFIED: ICD-10-CM

## 2024-08-14 PROCEDURE — 99215 OFFICE O/P EST HI 40 MIN: CPT

## 2024-08-14 PROCEDURE — G2211 COMPLEX E/M VISIT ADD ON: CPT

## 2024-08-16 NOTE — REVIEW OF SYSTEMS
[Fatigue] : fatigue [Diarrhea: Grade 2 - Increase of 4 - 6 stools per day over baseline; moderate increase in ostomy output compared to baseline] : Diarrhea: Grade 2 - Increase of 4 - 6 stools per day over baseline; moderate increase in ostomy output compared to baseline [Negative] : Allergic/Immunologic [FreeTextEntry7] : diarrhea [de-identified] : mild peeling at tips of 2 fingers

## 2024-08-16 NOTE — ASSESSMENT
[FreeTextEntry1] : Hx of ulcerative colitis with recurrent bowel obstructions due to proximal stricture which on initial biopsy around the rim showing 1 area with at least intramucosal adenocarcinoma.   In the rectum, there was also an ulcerated area with focal high-grade dysplasia in the background of low-grade dysplasia. Repeat colonoscopy and MRI pelvis did not show disease in the rectum.  Now s/p robotic assisted right hemicolectomy for hepatic flexure mass consistent with stage IIIC colon cancer.  High risk Stage III (N2b disease) 8/23 LN positive + LVI, PNI Margins neg VAL  Adjuvant chemotherapy with 6 months of FOLFOX. However, patient would like to defer port placement, therefore would have to be CAPEOX.   Discussed multiple times that Oxaliplatin has vesicant properties, and the preferred method of administration is via mediport. He is again declining placement of a mediport at this time and understands the risks of peripheral administration.  Discussed my concern with Xeloda given hx of UC and diarrhea, however patient prefers to try Xeloda.  Discussed importance of taking Xeloda as directed. Patient has been increasing his dose on some days (has extra pills). Notices he has worse diarrhea when he does this.  Re-discussed possibility of port- refused.  Signatera 4/3/24: negative  Cycle1: 4/10/24 Cycle 2: 5/1/24. labs reviewed and ok to treat.  Cycle 3: 5/22/24: labs reviewed. -Dose reduce to Xeloda 2000 mg BID 2/2 diarrhea Cycle 4:: 6/13/24: continues at 2000 mg BID due to diarrhea Cycle 5: 7/5/24: will decrease Xeloda to 1500 mg BID due to diarrhea and colonoscopy results Cycle 6: 7/24/24: continues on Xeloda 1500 mg BID Cycle 7: 8/14/24: decrease Xeloda to 1000 mg BID due to diarrhea  >CBC, CMP prior to treatments, CEA monthly >Xeloda 1000 mg qd Day 1-14 followed by 7 days off every 21 days >will dose reduce Oxali next cycle due to neuropathy >discussed again the importance of reconsidering port placement to complete treatment with 5FU infusion instead of Xeloda given increased GI upset. Patient states he will think on it and call with a decision >zofran prn for nausea  >imodium for diarrhea >follow up with GI  Return in 3 weeks

## 2024-08-16 NOTE — HISTORY OF PRESENT ILLNESS
[T: ___] : T[unfilled] [N: ___] : N[unfilled] [M: ___] : M[unfilled] [AJCC Stage: ____] : AJCC Stage: [unfilled] [Cycle: ___] : Cycle: [unfilled] [ECOG Performance Status: 1 - Restricted in physically strenuous activity but ambulatory and able to carry out work of a light or sedentary nature] : Performance Status: 1 - Restricted in physically strenuous activity but ambulatory and able to carry out work of a light or sedentary nature, e.g., light house work, office work [de-identified] : Mr. Teddy Myers is a 53 year old male who presents to the office for r/o colon cancer.  In 1/2/2024, patient was admitted for severe abdominal pain and was found to have a bowel obstruction.  Colonoscopy showed a stricture with polypoid feature proximal colon-suspect malignant, and a 6mm sigmoid colon polyp. biopsy around the rim and rectal area showed 1 area with at least intramucosal adenocarcinoma. In the rectum, there was an ulcerated area with focal high-grade dysplasia in the background of low-grade dysplasia.   CT C/A/P 1/2/2024: obstructing/stricture ring lesion at the splenic flexure of the colon, most likely adenocarcinoma. No evidence of metastatic disease. Last colonoscopy 10/2023 was negative  Patient had an NG tube placed for decompression. In the hospital he saw a colorectal surgeon who recommended a total proctocolectomy with ileal pouch.  Here today with daughter and aunt. He feels much better since discharge. No longer has abdominal pain. Has seen surgery and is very hesitant to agree to total proctocolectomy.  PATHOLOGY at [facility]: At the site of the right rim of structure: the microscopic description showed: one fragment of inflammatory type polyp and one somewhat degenerated fragment of mucosa with intersecting glands composed of mitotically active irregular nuclei, concerning for at least intramucosal adenocarcinoma. These cells largely lack goblet cells and do not appear classically colonic or intestinal.  No lymphocascular invasion   s/p robotic assisted right hemicolectomy after repeat obstruction on 2/29/24.   Pathology showed Invasive adenocarcinoma, poorly differentiated, with focal mucinous features. 8/23 LN positive. VAL.   FINAL DIAGNOSIS  A. RIGHT COLON: -Invasive adenocarcinoma, poorly differentiated, with focal mucinous features, 3.2 cm in greatest diameter, extending through muscularis propria into pericolonic fat, with focal involvement of serosal surface -Resection margins negative for adenocarcinoma -Perineural invasion, lymphatic invasion and vascular (intramural) invasion are seen -Tumor Bud score = 12 (high) -Eight of twenty-three lymph nodes, positive for carcinoma (8/23)                              Health Maintenace:  Colonoscopy: 8 months before this instance: March 2023 - negative Prostate exam: 10 years ago  Family hx:  Mother: throat ca: 50s   Social hx:  Smoker: former smoker: 15 years 1.5 packs a day ETOH: none Illicit drug: none Work:     [de-identified] : here for cycle 7 of CAPEOX Held Xeloda due to diarrhea. Now improved. completed steroid suppositories for rectal inflammation Denies any blood in stool or decreased appetite. No fevers, chills or sweats Has cold sensitivity but denies neuropathy, +skin peeling at tips of fingers.

## 2024-08-16 NOTE — PHYSICAL EXAM
[Restricted in physically strenuous activity but ambulatory and able to carry out work of a light or sedentary nature] : Status 1- Restricted in physically strenuous activity but ambulatory and able to carry out work of a light or sedentary nature, e.g., light house work, office work [Obese] : obese [Normal] : normal appearance, no rash, nodules, vesicles, ulcers, erythema [de-identified] : umbilical hernia. well healing laparoscopic entry points and suprapubic scar.

## 2024-08-16 NOTE — HISTORY OF PRESENT ILLNESS
[T: ___] : T[unfilled] [N: ___] : N[unfilled] [M: ___] : M[unfilled] [AJCC Stage: ____] : AJCC Stage: [unfilled] [Cycle: ___] : Cycle: [unfilled] [ECOG Performance Status: 1 - Restricted in physically strenuous activity but ambulatory and able to carry out work of a light or sedentary nature] : Performance Status: 1 - Restricted in physically strenuous activity but ambulatory and able to carry out work of a light or sedentary nature, e.g., light house work, office work [de-identified] : Mr. Teddy Myers is a 53 year old male who presents to the office for r/o colon cancer.  In 1/2/2024, patient was admitted for severe abdominal pain and was found to have a bowel obstruction.  Colonoscopy showed a stricture with polypoid feature proximal colon-suspect malignant, and a 6mm sigmoid colon polyp. biopsy around the rim and rectal area showed 1 area with at least intramucosal adenocarcinoma. In the rectum, there was an ulcerated area with focal high-grade dysplasia in the background of low-grade dysplasia.   CT C/A/P 1/2/2024: obstructing/stricture ring lesion at the splenic flexure of the colon, most likely adenocarcinoma. No evidence of metastatic disease. Last colonoscopy 10/2023 was negative  Patient had an NG tube placed for decompression. In the hospital he saw a colorectal surgeon who recommended a total proctocolectomy with ileal pouch.  Here today with daughter and aunt. He feels much better since discharge. No longer has abdominal pain. Has seen surgery and is very hesitant to agree to total proctocolectomy.  PATHOLOGY at [facility]: At the site of the right rim of structure: the microscopic description showed: one fragment of inflammatory type polyp and one somewhat degenerated fragment of mucosa with intersecting glands composed of mitotically active irregular nuclei, concerning for at least intramucosal adenocarcinoma. These cells largely lack goblet cells and do not appear classically colonic or intestinal.  No lymphocascular invasion   s/p robotic assisted right hemicolectomy after repeat obstruction on 2/29/24.   Pathology showed Invasive adenocarcinoma, poorly differentiated, with focal mucinous features. 8/23 LN positive. VAL.   FINAL DIAGNOSIS  A. RIGHT COLON: -Invasive adenocarcinoma, poorly differentiated, with focal mucinous features, 3.2 cm in greatest diameter, extending through muscularis propria into pericolonic fat, with focal involvement of serosal surface -Resection margins negative for adenocarcinoma -Perineural invasion, lymphatic invasion and vascular (intramural) invasion are seen -Tumor Bud score = 12 (high) -Eight of twenty-three lymph nodes, positive for carcinoma (8/23)                              Health Maintenace:  Colonoscopy: 8 months before this instance: March 2023 - negative Prostate exam: 10 years ago  Family hx:  Mother: throat ca: 50s   Social hx:  Smoker: former smoker: 15 years 1.5 packs a day ETOH: none Illicit drug: none Work:     [de-identified] : here for cycle 7 of CAPEOX Held Xeloda due to diarrhea. Now improved. completed steroid suppositories for rectal inflammation Denies any blood in stool or decreased appetite. No fevers, chills or sweats Has cold sensitivity but denies neuropathy, +skin peeling at tips of fingers.

## 2024-08-16 NOTE — PHYSICAL EXAM
[Restricted in physically strenuous activity but ambulatory and able to carry out work of a light or sedentary nature] : Status 1- Restricted in physically strenuous activity but ambulatory and able to carry out work of a light or sedentary nature, e.g., light house work, office work [Obese] : obese [Normal] : normal appearance, no rash, nodules, vesicles, ulcers, erythema [de-identified] : umbilical hernia. well healing laparoscopic entry points and suprapubic scar.

## 2024-08-16 NOTE — REVIEW OF SYSTEMS
[Fatigue] : fatigue [Diarrhea: Grade 2 - Increase of 4 - 6 stools per day over baseline; moderate increase in ostomy output compared to baseline] : Diarrhea: Grade 2 - Increase of 4 - 6 stools per day over baseline; moderate increase in ostomy output compared to baseline [Negative] : Allergic/Immunologic [FreeTextEntry7] : diarrhea [de-identified] : mild peeling at tips of 2 fingers

## 2024-08-25 ENCOUNTER — NON-APPOINTMENT (OUTPATIENT)
Age: 54
End: 2024-08-25

## 2024-08-26 RX ORDER — GABAPENTIN 300 MG/1
300 CAPSULE ORAL AT BEDTIME
Qty: 60 | Refills: 0 | Status: ACTIVE | COMMUNITY
Start: 2024-08-26 | End: 1900-01-01

## 2024-08-27 ENCOUNTER — NON-APPOINTMENT (OUTPATIENT)
Age: 54
End: 2024-08-27

## 2024-08-29 NOTE — PHYSICAL EXAM
[Restricted in physically strenuous activity but ambulatory and able to carry out work of a light or sedentary nature] : Status 1- Restricted in physically strenuous activity but ambulatory and able to carry out work of a light or sedentary nature, e.g., light house work, office work [Obese] : obese [Normal] : normal appearance, no rash, nodules, vesicles, ulcers, erythema [de-identified] : umbilical hernia. well healing laparoscopic entry points and suprapubic scar.

## 2024-08-29 NOTE — HISTORY OF PRESENT ILLNESS
[T: ___] : T[unfilled] [N: ___] : N[unfilled] [M: ___] : M[unfilled] [AJCC Stage: ____] : AJCC Stage: [unfilled] [de-identified] : Mr. Teddy Myers is a 53 year old male who presents to the office for r/o colon cancer.  In 1/2/2024, patient was admitted for severe abdominal pain and was found to have a bowel obstruction.  Colonoscopy showed a stricture with polypoid feature proximal colon-suspect malignant, and a 6mm sigmoid colon polyp. biopsy around the rim and rectal area showed 1 area with at least intramucosal adenocarcinoma. In the rectum, there was an ulcerated area with focal high-grade dysplasia in the background of low-grade dysplasia.   CT C/A/P 1/2/2024: obstructing/stricture ring lesion at the splenic flexure of the colon, most likely adenocarcinoma. No evidence of metastatic disease. Last colonoscopy 10/2023 was negative  Patient had an NG tube placed for decompression. In the hospital he saw a colorectal surgeon who recommended a total proctocolectomy with ileal pouch.  Here today with daughter and aunt. He feels much better since discharge. No longer has abdominal pain. Has seen surgery and is very hesitant to agree to total proctocolectomy.  PATHOLOGY at [facility]: At the site of the right rim of structure: the microscopic description showed: one fragment of inflammatory type polyp and one somewhat degenerated fragment of mucosa with intersecting glands composed of mitotically active irregular nuclei, concerning for at least intramucosal adenocarcinoma. These cells largely lack goblet cells and do not appear classically colonic or intestinal.  No lymphocascular invasion   s/p robotic assisted right hemicolectomy after repeat obstruction on 2/29/24.   Pathology showed Invasive adenocarcinoma, poorly differentiated, with focal mucinous features. 8/23 LN positive. VAL.   FINAL DIAGNOSIS  A. RIGHT COLON: -Invasive adenocarcinoma, poorly differentiated, with focal mucinous features, 3.2 cm in greatest diameter, extending through muscularis propria into pericolonic fat, with focal involvement of serosal surface -Resection margins negative for adenocarcinoma -Perineural invasion, lymphatic invasion and vascular (intramural) invasion are seen -Tumor Bud score = 12 (high) -Eight of twenty-three lymph nodes, positive for carcinoma (8/23)                              Health Maintenace:  Colonoscopy: 8 months before this instance: March 2023 - negative Prostate exam: 10 years ago  Family hx:  Mother: throat ca: 50s   Social hx:  Smoker: former smoker: 15 years 1.5 packs a day ETOH: none Illicit drug: none Work:     [Cycle: ___] : Cycle: [unfilled] [de-identified] : here for cycle 7 of CAPEOX Held Xeloda due to diarrhea. Now improved. completed steroid suppositories for rectal inflammation Denies any blood in stool or decreased appetite. No fevers, chills or sweats Has cold sensitivity but denies neuropathy, +skin peeling at tips of fingers.   [ECOG Performance Status: 1 - Restricted in physically strenuous activity but ambulatory and able to carry out work of a light or sedentary nature] : Performance Status: 1 - Restricted in physically strenuous activity but ambulatory and able to carry out work of a light or sedentary nature, e.g., light house work, office work

## 2024-08-29 NOTE — REVIEW OF SYSTEMS
[Fatigue] : fatigue [Diarrhea: Grade 2 - Increase of 4 - 6 stools per day over baseline; moderate increase in ostomy output compared to baseline] : Diarrhea: Grade 2 - Increase of 4 - 6 stools per day over baseline; moderate increase in ostomy output compared to baseline [Negative] : Allergic/Immunologic [FreeTextEntry7] : diarrhea [de-identified] : mild peeling at tips of 2 fingers

## 2024-09-04 ENCOUNTER — RESULT REVIEW (OUTPATIENT)
Age: 54
End: 2024-09-04

## 2024-09-04 ENCOUNTER — APPOINTMENT (OUTPATIENT)
Dept: HEMATOLOGY ONCOLOGY | Facility: CLINIC | Age: 54
End: 2024-09-04

## 2024-09-04 VITALS
HEIGHT: 74 IN | SYSTOLIC BLOOD PRESSURE: 130 MMHG | TEMPERATURE: 97.3 F | BODY MASS INDEX: 37.73 KG/M2 | WEIGHT: 294 LBS | RESPIRATION RATE: 16 BRPM | OXYGEN SATURATION: 97 % | HEART RATE: 85 BPM | DIASTOLIC BLOOD PRESSURE: 73 MMHG

## 2024-09-06 RX ORDER — ONDANSETRON 8 MG/1
8 TABLET, ORALLY DISINTEGRATING ORAL
Qty: 30 | Refills: 3 | Status: ACTIVE | COMMUNITY
Start: 2024-09-06 | End: 2025-01-04

## 2024-09-11 ENCOUNTER — RESULT REVIEW (OUTPATIENT)
Age: 54
End: 2024-09-11

## 2024-09-11 ENCOUNTER — APPOINTMENT (OUTPATIENT)
Dept: HEMATOLOGY ONCOLOGY | Facility: CLINIC | Age: 54
End: 2024-09-11
Payer: COMMERCIAL

## 2024-09-11 VITALS
BODY MASS INDEX: 25.41 KG/M2 | OXYGEN SATURATION: 97 % | RESPIRATION RATE: 16 BRPM | HEART RATE: 92 BPM | HEIGHT: 74 IN | SYSTOLIC BLOOD PRESSURE: 118 MMHG | WEIGHT: 198 LBS | TEMPERATURE: 97.8 F | DIASTOLIC BLOOD PRESSURE: 78 MMHG

## 2024-09-11 DIAGNOSIS — C18.3 MALIGNANT NEOPLASM OF HEPATIC FLEXURE: ICD-10-CM

## 2024-09-11 DIAGNOSIS — G62.0 DRUG-INDUCED POLYNEUROPATHY: ICD-10-CM

## 2024-09-11 DIAGNOSIS — T45.1X5A DRUG-INDUCED POLYNEUROPATHY: ICD-10-CM

## 2024-09-11 PROCEDURE — 99215 OFFICE O/P EST HI 40 MIN: CPT

## 2024-09-11 PROCEDURE — G2211 COMPLEX E/M VISIT ADD ON: CPT

## 2024-09-12 PROBLEM — G62.0 CHEMOTHERAPY-INDUCED NEUROPATHY: Status: ACTIVE | Noted: 2024-09-12

## 2024-09-12 NOTE — BEGINNING OF VISIT
[1] : 2) Feeling down, depressed, or hopeless for several days (1) [PHQ-9 Deferred] : PHQ-9 Deferred [Advised Primary Care Follow-up] : Advised Primary Care Follow-up  [[id number]] : 2 Abdomen soft, non-tender, no guarding.

## 2024-09-12 NOTE — BEGINNING OF VISIT
[1] : 2) Feeling down, depressed, or hopeless for several days (1) [PHQ-9 Deferred] : PHQ-9 Deferred [Advised Primary Care Follow-up] : Advised Primary Care Follow-up  [[id number]] : 2

## 2024-09-12 NOTE — BEGINNING OF VISIT
[1] : 2) Feeling down, depressed, or hopeless for several days (1) [PHQ-9 Deferred] : PHQ-9 Deferred [Advised Primary Care Follow-up] : Advised Primary Care Follow-up  [[id number]] : 2

## 2024-09-12 NOTE — HISTORY OF PRESENT ILLNESS
[de-identified] : Mr. Teddy Myers is a 53 year old male who presents to the office for r/o colon cancer.  In 1/2/2024, patient was admitted for severe abdominal pain and was found to have a bowel obstruction.  Colonoscopy showed a stricture with polypoid feature proximal colon-suspect malignant, and a 6mm sigmoid colon polyp. biopsy around the rim and rectal area showed 1 area with at least intramucosal adenocarcinoma. In the rectum, there was an ulcerated area with focal high-grade dysplasia in the background of low-grade dysplasia.   CT C/A/P 1/2/2024: obstructing/stricture ring lesion at the splenic flexure of the colon, most likely adenocarcinoma. No evidence of metastatic disease. Last colonoscopy 10/2023 was negative  Patient had an NG tube placed for decompression. In the hospital he saw a colorectal surgeon who recommended a total proctocolectomy with ileal pouch.  Here today with daughter and aunt. He feels much better since discharge. No longer has abdominal pain. Has seen surgery and is very hesitant to agree to total proctocolectomy.  PATHOLOGY at [facility]: At the site of the right rim of structure: the microscopic description showed: one fragment of inflammatory type polyp and one somewhat degenerated fragment of mucosa with intersecting glands composed of mitotically active irregular nuclei, concerning for at least intramucosal adenocarcinoma. These cells largely lack goblet cells and do not appear classically colonic or intestinal.  No lymphocascular invasion   s/p robotic assisted right hemicolectomy after repeat obstruction on 2/29/24.   Pathology showed Invasive adenocarcinoma, poorly differentiated, with focal mucinous features. 8/23 LN positive. VAL.   FINAL DIAGNOSIS  A. RIGHT COLON: -Invasive adenocarcinoma, poorly differentiated, with focal mucinous features, 3.2 cm in greatest diameter, extending through muscularis propria into pericolonic fat, with focal involvement of serosal surface -Resection margins negative for adenocarcinoma -Perineural invasion, lymphatic invasion and vascular (intramural) invasion are seen -Tumor Bud score = 12 (high) -Eight of twenty-three lymph nodes, positive for carcinoma (8/23)                              Health Maintenace:  Colonoscopy: 8 months before this instance: March 2023 - negative Prostate exam: 10 years ago  Family hx:  Mother: throat ca: 50s   Social hx:  Smoker: former smoker: 15 years 1.5 packs a day ETOH: none Illicit drug: none Work:     [de-identified] : here for cycle 8 of CAPEOX Holding Oxaliplatin 2/2 neuropathy. Diarrhea now resolved. Currently on Xeloda. Will complete cycle in 3 days Has cold sensitivity and tingling/numbness of fingers and toes  [1 - Distress Level] : Distress Level: 1

## 2024-09-12 NOTE — REVIEW OF SYSTEMS
[de-identified] : mild peeling at tips of 2 fingers [Negative] : Integumentary [FreeTextEntry7] : diarrhea [de-identified] : numbness and tingling of fingers and toes

## 2024-09-12 NOTE — HISTORY OF PRESENT ILLNESS
[de-identified] : Mr. Teddy Myers is a 53 year old male who presents to the office for r/o colon cancer.  In 1/2/2024, patient was admitted for severe abdominal pain and was found to have a bowel obstruction.  Colonoscopy showed a stricture with polypoid feature proximal colon-suspect malignant, and a 6mm sigmoid colon polyp. biopsy around the rim and rectal area showed 1 area with at least intramucosal adenocarcinoma. In the rectum, there was an ulcerated area with focal high-grade dysplasia in the background of low-grade dysplasia.   CT C/A/P 1/2/2024: obstructing/stricture ring lesion at the splenic flexure of the colon, most likely adenocarcinoma. No evidence of metastatic disease. Last colonoscopy 10/2023 was negative  Patient had an NG tube placed for decompression. In the hospital he saw a colorectal surgeon who recommended a total proctocolectomy with ileal pouch.  Here today with daughter and aunt. He feels much better since discharge. No longer has abdominal pain. Has seen surgery and is very hesitant to agree to total proctocolectomy.  PATHOLOGY at [facility]: At the site of the right rim of structure: the microscopic description showed: one fragment of inflammatory type polyp and one somewhat degenerated fragment of mucosa with intersecting glands composed of mitotically active irregular nuclei, concerning for at least intramucosal adenocarcinoma. These cells largely lack goblet cells and do not appear classically colonic or intestinal.  No lymphocascular invasion   s/p robotic assisted right hemicolectomy after repeat obstruction on 2/29/24.   Pathology showed Invasive adenocarcinoma, poorly differentiated, with focal mucinous features. 8/23 LN positive. VAL.   FINAL DIAGNOSIS  A. RIGHT COLON: -Invasive adenocarcinoma, poorly differentiated, with focal mucinous features, 3.2 cm in greatest diameter, extending through muscularis propria into pericolonic fat, with focal involvement of serosal surface -Resection margins negative for adenocarcinoma -Perineural invasion, lymphatic invasion and vascular (intramural) invasion are seen -Tumor Bud score = 12 (high) -Eight of twenty-three lymph nodes, positive for carcinoma (8/23)                              Health Maintenace:  Colonoscopy: 8 months before this instance: March 2023 - negative Prostate exam: 10 years ago  Family hx:  Mother: throat ca: 50s   Social hx:  Smoker: former smoker: 15 years 1.5 packs a day ETOH: none Illicit drug: none Work:     [de-identified] : here for cycle 8 of CAPEOX Holding Oxaliplatin 2/2 neuropathy. Diarrhea now resolved. Currently on Xeloda. Will complete cycle in 3 days Has cold sensitivity and tingling/numbness of fingers and toes  [1 - Distress Level] : Distress Level: 1

## 2024-09-12 NOTE — HISTORY OF PRESENT ILLNESS
[de-identified] : Mr. Teddy Myers is a 53 year old male who presents to the office for r/o colon cancer.  In 1/2/2024, patient was admitted for severe abdominal pain and was found to have a bowel obstruction.  Colonoscopy showed a stricture with polypoid feature proximal colon-suspect malignant, and a 6mm sigmoid colon polyp. biopsy around the rim and rectal area showed 1 area with at least intramucosal adenocarcinoma. In the rectum, there was an ulcerated area with focal high-grade dysplasia in the background of low-grade dysplasia.   CT C/A/P 1/2/2024: obstructing/stricture ring lesion at the splenic flexure of the colon, most likely adenocarcinoma. No evidence of metastatic disease. Last colonoscopy 10/2023 was negative  Patient had an NG tube placed for decompression. In the hospital he saw a colorectal surgeon who recommended a total proctocolectomy with ileal pouch.  Here today with daughter and aunt. He feels much better since discharge. No longer has abdominal pain. Has seen surgery and is very hesitant to agree to total proctocolectomy.  PATHOLOGY at [facility]: At the site of the right rim of structure: the microscopic description showed: one fragment of inflammatory type polyp and one somewhat degenerated fragment of mucosa with intersecting glands composed of mitotically active irregular nuclei, concerning for at least intramucosal adenocarcinoma. These cells largely lack goblet cells and do not appear classically colonic or intestinal.  No lymphocascular invasion   s/p robotic assisted right hemicolectomy after repeat obstruction on 2/29/24.   Pathology showed Invasive adenocarcinoma, poorly differentiated, with focal mucinous features. 8/23 LN positive. VAL.   FINAL DIAGNOSIS  A. RIGHT COLON: -Invasive adenocarcinoma, poorly differentiated, with focal mucinous features, 3.2 cm in greatest diameter, extending through muscularis propria into pericolonic fat, with focal involvement of serosal surface -Resection margins negative for adenocarcinoma -Perineural invasion, lymphatic invasion and vascular (intramural) invasion are seen -Tumor Bud score = 12 (high) -Eight of twenty-three lymph nodes, positive for carcinoma (8/23)                              Health Maintenace:  Colonoscopy: 8 months before this instance: March 2023 - negative Prostate exam: 10 years ago  Family hx:  Mother: throat ca: 50s   Social hx:  Smoker: former smoker: 15 years 1.5 packs a day ETOH: none Illicit drug: none Work:     [de-identified] : here for cycle 8 of CAPEOX Holding Oxaliplatin 2/2 neuropathy. Diarrhea now resolved. Currently on Xeloda. Will complete cycle in 3 days Has cold sensitivity and tingling/numbness of fingers and toes  [1 - Distress Level] : Distress Level: 1

## 2024-09-12 NOTE — REVIEW OF SYSTEMS
[de-identified] : mild peeling at tips of 2 fingers [Negative] : Integumentary [FreeTextEntry7] : diarrhea [de-identified] : numbness and tingling of fingers and toes

## 2024-09-12 NOTE — REVIEW OF SYSTEMS
"AE: Patient has had a cough going on 2 days. Stated while coughing has pain down into the lungs. Requesting to be seen today. Scheduled OV for tomorrow with RK. If patient is unable to be worked-in he is okay with keeping appointment tomorrow. Please review and advise.    Genaro Barbosa is a 70 year old male who calls with cough.    NURSING ASSESSMENT:  Description:  \"Coughing is hurting deep into the lung.\" Productive cough with yellow phlegm. Feels like he is making noise when breathing, possible wheezing. Denies chest pain, fevers, shortness of breath, coughing up blood.  Onset/duration:  2 days  Pain scale (0-10)   8/10  Last exam/Treatment:  04/24/2017  Allergies:   Allergies   Allergen Reactions     Ace Inhibitors Cough     Contrast Dye Other (See Comments)     Patient felt like ants were crawling all over him/ per patient's explaination 12-15-16/tw     NURSING PLAN: Routed to provider Yes    RECOMMENDED DISPOSITION:  See in 24 hours - for productive cough  Will comply with recommendation: Yes  If further questions/concerns or if symptoms do not improve, worsen or new symptoms develop, call your PCP or [facility] as soon as possible.    NOTES:  Disposition was determined by the first positive assessment question, therefore all previous assessment questions were negative    Guideline used:  Telephone Triage Protocols for Nurses, Fourth Edition, Caterina Richmond  Cough  Nursing Judgment  Routed to PCP    Fide Nino RN, BSN     " [de-identified] : mild peeling at tips of 2 fingers [Negative] : Integumentary [FreeTextEntry7] : diarrhea [de-identified] : numbness and tingling of fingers and toes

## 2024-09-12 NOTE — HISTORY OF PRESENT ILLNESS
[de-identified] : Mr. Teddy Myers is a 53 year old male who presents to the office for r/o colon cancer.  In 1/2/2024, patient was admitted for severe abdominal pain and was found to have a bowel obstruction.  Colonoscopy showed a stricture with polypoid feature proximal colon-suspect malignant, and a 6mm sigmoid colon polyp. biopsy around the rim and rectal area showed 1 area with at least intramucosal adenocarcinoma. In the rectum, there was an ulcerated area with focal high-grade dysplasia in the background of low-grade dysplasia.   CT C/A/P 1/2/2024: obstructing/stricture ring lesion at the splenic flexure of the colon, most likely adenocarcinoma. No evidence of metastatic disease. Last colonoscopy 10/2023 was negative  Patient had an NG tube placed for decompression. In the hospital he saw a colorectal surgeon who recommended a total proctocolectomy with ileal pouch.  Here today with daughter and aunt. He feels much better since discharge. No longer has abdominal pain. Has seen surgery and is very hesitant to agree to total proctocolectomy.  PATHOLOGY at [facility]: At the site of the right rim of structure: the microscopic description showed: one fragment of inflammatory type polyp and one somewhat degenerated fragment of mucosa with intersecting glands composed of mitotically active irregular nuclei, concerning for at least intramucosal adenocarcinoma. These cells largely lack goblet cells and do not appear classically colonic or intestinal.  No lymphocascular invasion   s/p robotic assisted right hemicolectomy after repeat obstruction on 2/29/24.   Pathology showed Invasive adenocarcinoma, poorly differentiated, with focal mucinous features. 8/23 LN positive. VAL.   FINAL DIAGNOSIS  A. RIGHT COLON: -Invasive adenocarcinoma, poorly differentiated, with focal mucinous features, 3.2 cm in greatest diameter, extending through muscularis propria into pericolonic fat, with focal involvement of serosal surface -Resection margins negative for adenocarcinoma -Perineural invasion, lymphatic invasion and vascular (intramural) invasion are seen -Tumor Bud score = 12 (high) -Eight of twenty-three lymph nodes, positive for carcinoma (8/23)                              Health Maintenace:  Colonoscopy: 8 months before this instance: March 2023 - negative Prostate exam: 10 years ago  Family hx:  Mother: throat ca: 50s   Social hx:  Smoker: former smoker: 15 years 1.5 packs a day ETOH: none Illicit drug: none Work:     [de-identified] : here for cycle 8 of CAPEOX Holding Oxaliplatin 2/2 neuropathy. Diarrhea now resolved. Currently on Xeloda. Will complete cycle in 3 days Has cold sensitivity and tingling/numbness of fingers and toes  [1 - Distress Level] : Distress Level: 1

## 2024-09-12 NOTE — ASSESSMENT
[FreeTextEntry1] : Hx of ulcerative colitis with recurrent bowel obstructions due to proximal stricture which on initial biopsy around the rim showing 1 area with at least intramucosal adenocarcinoma.   In the rectum, there was also an ulcerated area with focal high-grade dysplasia in the background of low-grade dysplasia. Repeat colonoscopy and MRI pelvis did not show disease in the rectum.  Now s/p robotic assisted right hemicolectomy for hepatic flexure mass consistent with stage IIIC colon cancer.  High risk Stage III (N2b disease) 8/23 LN positive + LVI, PNI Margins neg VAL  Adjuvant chemotherapy with 6 months of FOLFOX. However, patient would like to defer port placement, therefore would have to be CAPEOX.   Discussed multiple times that Oxaliplatin has vesicant properties, and the preferred method of administration is via mediport. He is again declining placement of a mediport at this time and understands the risks of peripheral administration.  Discussed my concern with Xeloda given hx of UC and diarrhea, however patient prefers to try Xeloda.  Discussed importance of taking Xeloda as directed. Patient has been increasing his dose on some days (has extra pills). Notices he has worse diarrhea when he does this.  Re-discussed possibility of port- refused.  Signatera 4/3/24: negative  Cycle1: 4/10/24 Cycle 2: 5/1/24. labs reviewed and ok to treat.  Cycle 3: 5/22/24: labs reviewed. -Dose reduce to Xeloda 2000 mg BID 2/2 diarrhea Cycle 4:: 6/13/24: continues at 2000 mg BID due to diarrhea Cycle 5: 7/5/24: will decrease Xeloda to 1500 mg BID due to diarrhea and colonoscopy results Cycle 6: 7/24/24: continues on Xeloda 1500 mg BID Cycle 7: 8/14/24: decrease Xeloda to 1000 mg BID due to diarrhea Cycle 8: 9/4/24: Xeloda 1000 mg BID. Holding Oxali- neuropathy  >CBC, CMP prior to treatments, CEA monthly >Xeloda 1000 mg qd Day 1-14 followed by 7 days off every 21 days- due to finish in 3 days >Oxali discontinued due to neuropathy >zofran prn for nausea  >imodium for diarrhea >follow up with GI  Return in 3 weeks

## 2024-09-12 NOTE — REVIEW OF SYSTEMS
[de-identified] : mild peeling at tips of 2 fingers [Negative] : Integumentary [FreeTextEntry7] : diarrhea [de-identified] : numbness and tingling of fingers and toes

## 2024-09-12 NOTE — BEGINNING OF VISIT
[1] : 2) Feeling down, depressed, or hopeless for several days (1) [PHQ-9 Deferred] : PHQ-9 Deferred [Advised Primary Care Follow-up] : Advised Primary Care Follow-up  [[id number]] : 2

## 2024-09-27 ENCOUNTER — APPOINTMENT (OUTPATIENT)
Dept: HEMATOLOGY ONCOLOGY | Facility: CLINIC | Age: 54
End: 2024-09-27
Payer: COMMERCIAL

## 2024-09-27 ENCOUNTER — RESULT REVIEW (OUTPATIENT)
Age: 54
End: 2024-09-27

## 2024-09-27 VITALS
TEMPERATURE: 97.8 F | RESPIRATION RATE: 16 BRPM | HEIGHT: 74 IN | DIASTOLIC BLOOD PRESSURE: 84 MMHG | HEART RATE: 105 BPM | WEIGHT: 300.31 LBS | SYSTOLIC BLOOD PRESSURE: 134 MMHG | OXYGEN SATURATION: 95 % | BODY MASS INDEX: 38.54 KG/M2

## 2024-09-27 DIAGNOSIS — T45.1X5A DRUG-INDUCED POLYNEUROPATHY: ICD-10-CM

## 2024-09-27 DIAGNOSIS — K51.018 ULCERATIVE (CHRONIC) PANCOLITIS WITH OTHER COMPLICATION: ICD-10-CM

## 2024-09-27 DIAGNOSIS — G62.0 DRUG-INDUCED POLYNEUROPATHY: ICD-10-CM

## 2024-09-27 DIAGNOSIS — C18.3 MALIGNANT NEOPLASM OF HEPATIC FLEXURE: ICD-10-CM

## 2024-09-27 DIAGNOSIS — R73.09 OTHER ABNORMAL GLUCOSE: ICD-10-CM

## 2024-09-27 PROCEDURE — 99215 OFFICE O/P EST HI 40 MIN: CPT

## 2024-09-27 PROCEDURE — G2211 COMPLEX E/M VISIT ADD ON: CPT

## 2024-09-27 RX ORDER — LOPERAMIDE HYDROCHLORIDE 2 MG/1
2 CAPSULE ORAL
Qty: 240 | Refills: 3 | Status: ACTIVE | COMMUNITY
Start: 2024-09-27 | End: 1900-01-01

## 2024-09-27 RX ORDER — CAPECITABINE 500 MG/1
500 TABLET ORAL TWICE DAILY
Qty: 84 | Refills: 0 | Status: ACTIVE | COMMUNITY
Start: 2024-09-27 | End: 1900-01-01

## 2024-09-30 NOTE — HISTORY OF PRESENT ILLNESS
[T: ___] : T[unfilled] [N: ___] : N[unfilled] [M: ___] : M[unfilled] [AJCC Stage: ____] : AJCC Stage: [unfilled] [Cycle: ___] : Cycle: [unfilled] [1 - Distress Level] : Distress Level: 1 [ECOG Performance Status: 1 - Restricted in physically strenuous activity but ambulatory and able to carry out work of a light or sedentary nature] : Performance Status: 1 - Restricted in physically strenuous activity but ambulatory and able to carry out work of a light or sedentary nature, e.g., light house work, office work [de-identified] : Mr. Teddy Myers is a 53 year old male who presents to the office for r/o colon cancer.  In 1/2/2024, patient was admitted for severe abdominal pain and was found to have a bowel obstruction.  Colonoscopy showed a stricture with polypoid feature proximal colon-suspect malignant, and a 6mm sigmoid colon polyp. biopsy around the rim and rectal area showed 1 area with at least intramucosal adenocarcinoma. In the rectum, there was an ulcerated area with focal high-grade dysplasia in the background of low-grade dysplasia.   CT C/A/P 1/2/2024: obstructing/stricture ring lesion at the splenic flexure of the colon, most likely adenocarcinoma. No evidence of metastatic disease. Last colonoscopy 10/2023 was negative  Patient had an NG tube placed for decompression. In the hospital he saw a colorectal surgeon who recommended a total proctocolectomy with ileal pouch.  Here today with daughter and aunt. He feels much better since discharge. No longer has abdominal pain. Has seen surgery and is very hesitant to agree to total proctocolectomy.  PATHOLOGY at [facility]: At the site of the right rim of structure: the microscopic description showed: one fragment of inflammatory type polyp and one somewhat degenerated fragment of mucosa with intersecting glands composed of mitotically active irregular nuclei, concerning for at least intramucosal adenocarcinoma. These cells largely lack goblet cells and do not appear classically colonic or intestinal.  No lymphocascular invasion   s/p robotic assisted right hemicolectomy after repeat obstruction on 2/29/24.   Pathology showed Invasive adenocarcinoma, poorly differentiated, with focal mucinous features. 8/23 LN positive. VAL.   FINAL DIAGNOSIS  A. RIGHT COLON: -Invasive adenocarcinoma, poorly differentiated, with focal mucinous features, 3.2 cm in greatest diameter, extending through muscularis propria into pericolonic fat, with focal involvement of serosal surface -Resection margins negative for adenocarcinoma -Perineural invasion, lymphatic invasion and vascular (intramural) invasion are seen -Tumor Bud score = 12 (high) -Eight of twenty-three lymph nodes, positive for carcinoma (8/23)                              Health Maintenace:  Colonoscopy: 8 months before this instance: March 2023 - negative Prostate exam: 10 years ago  Family hx:  Mother: throat ca: 50s   Social hx:  Smoker: former smoker: 15 years 1.5 packs a day ETOH: none Illicit drug: none Work:     [de-identified] : here for cycle 9 of CAPEOX Diarrhea now resolved. Will resume Xeloda. Holding Oxaliplatin 2/2 neuropathy. Has cold sensitivity and tingling/numbness of fingers and toes

## 2024-09-30 NOTE — ASSESSMENT
[FreeTextEntry1] : Hx of ulcerative colitis with recurrent bowel obstructions due to proximal stricture which on initial biopsy around the rim showing 1 area with at least intramucosal adenocarcinoma.   In the rectum, there was also an ulcerated area with focal high-grade dysplasia in the background of low-grade dysplasia. Repeat colonoscopy and MRI pelvis did not show disease in the rectum.  Now s/p robotic assisted right hemicolectomy for hepatic flexure mass consistent with stage IIIC colon cancer.  High risk Stage III (N2b disease) 8/23 LN positive + LVI, PNI Margins neg VAL  Adjuvant chemotherapy with 6 months of FOLFOX. However, patient would like to defer port placement, therefore would have to be CAPEOX.   Discussed multiple times that Oxaliplatin has vesicant properties, and the preferred method of administration is via mediport. He is again declining placement of a mediport at this time and understands the risks of peripheral administration.  Discussed my concern with Xeloda given hx of UC and diarrhea, however patient prefers to try Xeloda.  Discussed importance of taking Xeloda as directed. Patient has been increasing his dose on some days (has extra pills). Notices he has worse diarrhea when he does this.  Re-discussed possibility of port- refused.  Signatera 4/3/24: negative  Cycle1: 4/10/24 Cycle 2: 5/1/24. labs reviewed and ok to treat.  Cycle 3: 5/22/24: labs reviewed. -Dose reduce to Xeloda 2000 mg BID 2/2 diarrhea Cycle 4:: 6/13/24: continues at 2000 mg BID due to diarrhea Cycle 5: 7/5/24: will decrease Xeloda to 1500 mg BID due to diarrhea and colonoscopy results Cycle 6: 7/24/24: continues on Xeloda 1500 mg BID Cycle 7: 8/14/24: decrease Xeloda to 1000 mg BID due to diarrhea Cycle 8: 9/4/24: Xeloda 1000 mg BID. Holding Oxali- neuropathy Cycle 9: 9/27/24: Xeloda 1000 mg BID. no oxali  >CBC, CMP, ctDNA - [facility], [facility] monthly >Xeloda 1000 mg qd Day 1-14 followed by 7 days off every 21 days- due to finish in 3 days >Oxali discontinued due to neuropathy >zofran prn for nausea  >imodium for diarrhea >follow up with GI  Lab check next week due to elevated glucose.   Return in 3 weeks

## 2024-09-30 NOTE — PHYSICAL EXAM
[Restricted in physically strenuous activity but ambulatory and able to carry out work of a light or sedentary nature] : Status 1- Restricted in physically strenuous activity but ambulatory and able to carry out work of a light or sedentary nature, e.g., light house work, office work [Obese] : obese [Normal] : normal appearance, no rash, nodules, vesicles, ulcers, erythema [de-identified] : umbilical hernia. well healing laparoscopic entry points and suprapubic scar.

## 2024-09-30 NOTE — REVIEW OF SYSTEMS
[Fatigue] : fatigue [Diarrhea: Grade 2 - Increase of 4 - 6 stools per day over baseline; moderate increase in ostomy output compared to baseline] : Diarrhea: Grade 2 - Increase of 4 - 6 stools per day over baseline; moderate increase in ostomy output compared to baseline [Negative] : Allergic/Immunologic [FreeTextEntry7] : diarrhea [de-identified] : numbness and tingling of fingers and toes

## 2024-09-30 NOTE — PHYSICAL EXAM
[Restricted in physically strenuous activity but ambulatory and able to carry out work of a light or sedentary nature] : Status 1- Restricted in physically strenuous activity but ambulatory and able to carry out work of a light or sedentary nature, e.g., light house work, office work [Obese] : obese [Normal] : normal appearance, no rash, nodules, vesicles, ulcers, erythema [de-identified] : umbilical hernia. well healing laparoscopic entry points and suprapubic scar.

## 2024-09-30 NOTE — REVIEW OF SYSTEMS
[Fatigue] : fatigue [Diarrhea: Grade 2 - Increase of 4 - 6 stools per day over baseline; moderate increase in ostomy output compared to baseline] : Diarrhea: Grade 2 - Increase of 4 - 6 stools per day over baseline; moderate increase in ostomy output compared to baseline [Negative] : Allergic/Immunologic [FreeTextEntry7] : diarrhea [de-identified] : numbness and tingling of fingers and toes

## 2024-09-30 NOTE — HISTORY OF PRESENT ILLNESS
[T: ___] : T[unfilled] [N: ___] : N[unfilled] [M: ___] : M[unfilled] [AJCC Stage: ____] : AJCC Stage: [unfilled] [Cycle: ___] : Cycle: [unfilled] [1 - Distress Level] : Distress Level: 1 [ECOG Performance Status: 1 - Restricted in physically strenuous activity but ambulatory and able to carry out work of a light or sedentary nature] : Performance Status: 1 - Restricted in physically strenuous activity but ambulatory and able to carry out work of a light or sedentary nature, e.g., light house work, office work [de-identified] : Mr. Teddy Myers is a 53 year old male who presents to the office for r/o colon cancer.  In 1/2/2024, patient was admitted for severe abdominal pain and was found to have a bowel obstruction.  Colonoscopy showed a stricture with polypoid feature proximal colon-suspect malignant, and a 6mm sigmoid colon polyp. biopsy around the rim and rectal area showed 1 area with at least intramucosal adenocarcinoma. In the rectum, there was an ulcerated area with focal high-grade dysplasia in the background of low-grade dysplasia.   CT C/A/P 1/2/2024: obstructing/stricture ring lesion at the splenic flexure of the colon, most likely adenocarcinoma. No evidence of metastatic disease. Last colonoscopy 10/2023 was negative  Patient had an NG tube placed for decompression. In the hospital he saw a colorectal surgeon who recommended a total proctocolectomy with ileal pouch.  Here today with daughter and aunt. He feels much better since discharge. No longer has abdominal pain. Has seen surgery and is very hesitant to agree to total proctocolectomy.  PATHOLOGY at [facility]: At the site of the right rim of structure: the microscopic description showed: one fragment of inflammatory type polyp and one somewhat degenerated fragment of mucosa with intersecting glands composed of mitotically active irregular nuclei, concerning for at least intramucosal adenocarcinoma. These cells largely lack goblet cells and do not appear classically colonic or intestinal.  No lymphocascular invasion   s/p robotic assisted right hemicolectomy after repeat obstruction on 2/29/24.   Pathology showed Invasive adenocarcinoma, poorly differentiated, with focal mucinous features. 8/23 LN positive. VAL.   FINAL DIAGNOSIS  A. RIGHT COLON: -Invasive adenocarcinoma, poorly differentiated, with focal mucinous features, 3.2 cm in greatest diameter, extending through muscularis propria into pericolonic fat, with focal involvement of serosal surface -Resection margins negative for adenocarcinoma -Perineural invasion, lymphatic invasion and vascular (intramural) invasion are seen -Tumor Bud score = 12 (high) -Eight of twenty-three lymph nodes, positive for carcinoma (8/23)                              Health Maintenace:  Colonoscopy: 8 months before this instance: March 2023 - negative Prostate exam: 10 years ago  Family hx:  Mother: throat ca: 50s   Social hx:  Smoker: former smoker: 15 years 1.5 packs a day ETOH: none Illicit drug: none Work:     [de-identified] : here for cycle 9 of CAPEOX Diarrhea now resolved. Will resume Xeloda. Holding Oxaliplatin 2/2 neuropathy. Has cold sensitivity and tingling/numbness of fingers and toes

## 2024-10-02 ENCOUNTER — RESULT REVIEW (OUTPATIENT)
Age: 54
End: 2024-10-02

## 2024-10-02 ENCOUNTER — APPOINTMENT (OUTPATIENT)
Dept: HEMATOLOGY ONCOLOGY | Facility: CLINIC | Age: 54
End: 2024-10-02

## 2024-10-02 VITALS
BODY MASS INDEX: 37.99 KG/M2 | DIASTOLIC BLOOD PRESSURE: 75 MMHG | SYSTOLIC BLOOD PRESSURE: 113 MMHG | WEIGHT: 296 LBS | TEMPERATURE: 97.2 F | HEART RATE: 86 BPM | OXYGEN SATURATION: 97 % | HEIGHT: 74 IN | RESPIRATION RATE: 16 BRPM

## 2024-10-02 PROBLEM — E11.9 DIABETES: Status: ACTIVE | Noted: 2024-10-02

## 2024-10-02 RX ORDER — METFORMIN HYDROCHLORIDE 1000 MG/1
1000 TABLET, COATED ORAL DAILY
Qty: 30 | Refills: 0 | Status: ACTIVE | COMMUNITY
Start: 2024-10-02 | End: 1900-01-01

## 2024-10-03 ENCOUNTER — NON-APPOINTMENT (OUTPATIENT)
Age: 54
End: 2024-10-03

## 2024-10-04 ENCOUNTER — APPOINTMENT (OUTPATIENT)
Dept: ENDOCRINOLOGY | Facility: CLINIC | Age: 54
End: 2024-10-04
Payer: COMMERCIAL

## 2024-10-04 VITALS
DIASTOLIC BLOOD PRESSURE: 76 MMHG | BODY MASS INDEX: 37.35 KG/M2 | SYSTOLIC BLOOD PRESSURE: 124 MMHG | OXYGEN SATURATION: 95 % | WEIGHT: 291 LBS | HEIGHT: 74 IN | HEART RATE: 83 BPM

## 2024-10-04 DIAGNOSIS — E11.8 TYPE 2 DIABETES MELLITUS WITH UNSPECIFIED COMPLICATIONS: ICD-10-CM

## 2024-10-04 DIAGNOSIS — E66.01 OBESITY, CLASS 2: ICD-10-CM

## 2024-10-04 DIAGNOSIS — R19.7 DIARRHEA, UNSPECIFIED: ICD-10-CM

## 2024-10-04 DIAGNOSIS — I10 ESSENTIAL (PRIMARY) HYPERTENSION: ICD-10-CM

## 2024-10-04 DIAGNOSIS — E66.812 OBESITY, CLASS 2: ICD-10-CM

## 2024-10-04 LAB — GLUCOSE BLDC GLUCOMTR-MCNC: 208

## 2024-10-04 PROCEDURE — 82962 GLUCOSE BLOOD TEST: CPT

## 2024-10-04 PROCEDURE — 99205 OFFICE O/P NEW HI 60 MIN: CPT

## 2024-10-04 PROCEDURE — G2211 COMPLEX E/M VISIT ADD ON: CPT

## 2024-10-04 RX ORDER — INSULIN ASPART 100 [IU]/ML
(70-30) 100 INJECTION, SUSPENSION SUBCUTANEOUS
Qty: 1 | Refills: 3 | Status: ACTIVE | COMMUNITY
Start: 2024-10-04 | End: 1900-01-01

## 2024-10-04 RX ORDER — BLOOD-GLUCOSE METER
KIT MISCELLANEOUS
Qty: 2 | Refills: 3 | Status: ACTIVE | COMMUNITY
Start: 2024-10-04 | End: 1900-01-01

## 2024-10-04 RX ORDER — BLOOD-GLUCOSE METER
W/DEVICE KIT MISCELLANEOUS
Qty: 1 | Refills: 0 | Status: ACTIVE | COMMUNITY
Start: 2024-10-04 | End: 1900-01-01

## 2024-10-04 RX ORDER — BLOOD-GLUCOSE SENSOR
EACH MISCELLANEOUS
Qty: 6 | Refills: 3 | Status: ACTIVE | COMMUNITY
Start: 2024-10-04 | End: 1900-01-01

## 2024-10-04 RX ORDER — METFORMIN ER 500 MG 500 MG/1
500 TABLET ORAL
Qty: 360 | Refills: 3 | Status: ACTIVE | COMMUNITY
Start: 2024-10-04 | End: 1900-01-01

## 2024-10-04 RX ORDER — LANCETS 33 GAUGE
EACH MISCELLANEOUS
Qty: 1 | Refills: 3 | Status: ACTIVE | COMMUNITY
Start: 2024-10-04 | End: 1900-01-01

## 2024-10-05 LAB
CHOLEST SERPL-MCNC: 186 MG/DL
FOLATE SERPL-MCNC: 14.5 NG/ML
HDLC SERPL-MCNC: 46 MG/DL
LDLC SERPL CALC-MCNC: 109 MG/DL
NONHDLC SERPL-MCNC: 140 MG/DL
TRIGL SERPL-MCNC: 179 MG/DL
TSH SERPL-ACNC: 3.49 UIU/ML
VIT B12 SERPL-MCNC: 853 PG/ML

## 2024-10-06 PROBLEM — E66.812 CLASS 2 SEVERE OBESITY DUE TO EXCESS CALORIES WITH SERIOUS COMORBIDITY AND BODY MASS INDEX (BMI) OF 35.0 TO 35.9 IN ADULT: Status: ACTIVE | Noted: 2024-10-04

## 2024-10-06 PROBLEM — E11.8 DIABETES MELLITUS TYPE 2 WITH COMPLICATIONS: Status: ACTIVE | Noted: 2024-10-04

## 2024-10-06 NOTE — CONSULT LETTER
[Dear  ___] : Dear  [unfilled], [Consult Letter:] : I had the pleasure of evaluating your patient, [unfilled]. [Please see my note below.] : Please see my note below. [Consult Closing:] : Thank you very much for allowing me to participate in the care of this patient.  If you have any questions, please do not hesitate to contact me. [Sincerely,] : Sincerely, [FreeTextEntry3] : Sincerely,  SONIA THAPA MD [facility]

## 2024-10-06 NOTE — PHYSICAL EXAM
[Alert] : alert [Well Nourished] : well nourished [Obese] : obese [No Acute Distress] : no acute distress [Well Developed] : well developed [Normal Sclera/Conjunctiva] : normal sclera/conjunctiva [EOMI] : extra ocular movement intact [No Proptosis] : no proptosis [Normal Oropharynx] : the oropharynx was normal [Thyroid Not Enlarged] : the thyroid was not enlarged [No Thyroid Nodules] : no palpable thyroid nodules [No Respiratory Distress] : no respiratory distress [No Accessory Muscle Use] : no accessory muscle use [Clear to Auscultation] : lungs were clear to auscultation bilaterally [Normal S1, S2] : normal S1 and S2 [Normal Rate] : heart rate was normal [Regular Rhythm] : with a regular rhythm [No Edema] : no peripheral edema [Pedal Pulses Normal] : the pedal pulses are present [Normal Bowel Sounds] : normal bowel sounds [Not Tender] : non-tender [Not Distended] : not distended [Soft] : abdomen soft [Normal Anterior Cervical Nodes] : no anterior cervical lymphadenopathy [Normal Posterior Cervical Nodes] : no posterior cervical lymphadenopathy [No Spinal Tenderness] : no spinal tenderness [Spine Straight] : spine straight [No Stigmata of Cushings Syndrome] : no stigmata of Cushings Syndrome [Normal Gait] : normal gait [Normal Strength/Tone] : muscle strength and tone were normal [No Rash] : no rash [Normal Reflexes] : deep tendon reflexes were 2+ and symmetric [No Tremors] : no tremors [Oriented x3] : oriented to person, place, and time [Acanthosis Nigricans] : no acanthosis nigricans [de-identified] : tattoo  [de-identified] : anxious

## 2024-10-06 NOTE — ASSESSMENT
[FreeTextEntry1] : Patient here for placement of Freestyle Dalia 3 plus CGM as indicated by MD's order. Skin intact, no redness, no swelling. Sensor placed using aseptic technique on the upper left arm and secured with clear Tegaderm film. Patient tolerated sensor placement well. Patient educated to look for signs and symptoms of site infection, device care at home and need for daily self-monitoring of blood glucose/fingersticks. Patient verbalized understanding; all questions answered. Patient instructed to contact office ([phone]) with any questions or concerns. Patient to return to office in 2 weeks for sensor report download/ follow up appointment.

## 2024-10-06 NOTE — ASSESSMENT
[FreeTextEntry1] : Patient here for placement of Freestyle Dalia 3 plus CGM as indicated by MD's order. Skin intact, no redness, no swelling. Sensor placed using aseptic technique on the upper left arm and secured with clear Tegaderm film. Patient tolerated sensor placement well. Patient educated to look for signs and symptoms of site infection, device care at home and need for daily self-monitoring of blood glucose/fingersticks. Patient verbalized understanding; all questions answered. Patient instructed to contact office [phone]) with any questions or concerns. Patient to return to office in 2 weeks for sensor report download/ follow up appointment.

## 2024-10-06 NOTE — PHYSICAL EXAM
[Alert] : alert [Well Nourished] : well nourished [Obese] : obese [No Acute Distress] : no acute distress [Well Developed] : well developed [Normal Sclera/Conjunctiva] : normal sclera/conjunctiva [EOMI] : extra ocular movement intact [No Proptosis] : no proptosis [Normal Oropharynx] : the oropharynx was normal [Thyroid Not Enlarged] : the thyroid was not enlarged [No Thyroid Nodules] : no palpable thyroid nodules [No Respiratory Distress] : no respiratory distress [No Accessory Muscle Use] : no accessory muscle use [Clear to Auscultation] : lungs were clear to auscultation bilaterally [Normal S1, S2] : normal S1 and S2 [Normal Rate] : heart rate was normal [Regular Rhythm] : with a regular rhythm [No Edema] : no peripheral edema [Pedal Pulses Normal] : the pedal pulses are present [Normal Bowel Sounds] : normal bowel sounds [Not Tender] : non-tender [Not Distended] : not distended [Soft] : abdomen soft [Normal Anterior Cervical Nodes] : no anterior cervical lymphadenopathy [Normal Posterior Cervical Nodes] : no posterior cervical lymphadenopathy [No Spinal Tenderness] : no spinal tenderness [Spine Straight] : spine straight [No Stigmata of Cushings Syndrome] : no stigmata of Cushings Syndrome [Normal Gait] : normal gait [Normal Strength/Tone] : muscle strength and tone were normal [No Rash] : no rash [Normal Reflexes] : deep tendon reflexes were 2+ and symmetric [No Tremors] : no tremors [Oriented x3] : oriented to person, place, and time [Acanthosis Nigricans] : no acanthosis nigricans [de-identified] : tattoo  [de-identified] : anxious

## 2024-10-06 NOTE — HISTORY OF PRESENT ILLNESS
[FreeTextEntry1] : Mr. MILA CRUZ is a 54 year old male with stage 3 colon cancer sent in a consult by Dr VIRGINIA AKHTAR for new onset DM2  last IV chemo was August 2024  type 2 DM on 10/2024 new diagnosis          on started Metformin 3 days ago tolerated well   Last HgA1c on  10/2024    was 10.2  Checks BS none   Microvascular complications: Nephropathy none Neuropathy  symptoms, yes  microfilament exam  Retinopathy    unknown    last eye exam none  Macrovascular complications:  HTN at goal not on meds CAD/CVA denies  Lipids  at goal  not on statin   with  LFT's elevated

## 2024-10-06 NOTE — CONSULT LETTER
[Dear  ___] : Dear  [unfilled], [Consult Letter:] : I had the pleasure of evaluating your patient, [unfilled]. [Please see my note below.] : Please see my note below. [Consult Closing:] : Thank you very much for allowing me to participate in the care of this patient.  If you have any questions, please do not hesitate to contact me. [Sincerely,] : Sincerely, [FreeTextEntry3] : Sincerely,  SONIA THAPA MD [facility]

## 2024-10-07 DIAGNOSIS — E11.9 TYPE 2 DIABETES MELLITUS W/OUT COMPLICATIONS: ICD-10-CM

## 2024-10-07 RX ORDER — PEN NEEDLE, DIABETIC 29 G X1/2"
32G X 4 MM NEEDLE, DISPOSABLE MISCELLANEOUS
Qty: 2 | Refills: 3 | Status: ACTIVE | COMMUNITY
Start: 2024-10-07 | End: 1900-01-01

## 2024-10-10 ENCOUNTER — NON-APPOINTMENT (OUTPATIENT)
Age: 54
End: 2024-10-10

## 2024-10-11 ENCOUNTER — NON-APPOINTMENT (OUTPATIENT)
Age: 54
End: 2024-10-11

## 2024-10-16 ENCOUNTER — APPOINTMENT (OUTPATIENT)
Dept: HEMATOLOGY ONCOLOGY | Facility: CLINIC | Age: 54
End: 2024-10-16

## 2024-10-17 ENCOUNTER — APPOINTMENT (OUTPATIENT)
Dept: ENDOCRINOLOGY | Facility: CLINIC | Age: 54
End: 2024-10-17

## 2024-10-18 ENCOUNTER — RESULT REVIEW (OUTPATIENT)
Age: 54
End: 2024-10-18

## 2024-10-18 ENCOUNTER — APPOINTMENT (OUTPATIENT)
Dept: ENDOCRINOLOGY | Facility: CLINIC | Age: 54
End: 2024-10-18

## 2024-10-18 ENCOUNTER — APPOINTMENT (OUTPATIENT)
Dept: HEMATOLOGY ONCOLOGY | Facility: CLINIC | Age: 54
End: 2024-10-18
Payer: COMMERCIAL

## 2024-10-18 ENCOUNTER — APPOINTMENT (OUTPATIENT)
Dept: ENDOCRINOLOGY | Facility: CLINIC | Age: 54
End: 2024-10-18
Payer: COMMERCIAL

## 2024-10-18 VITALS
OXYGEN SATURATION: 98 % | HEIGHT: 74 IN | RESPIRATION RATE: 16 BRPM | TEMPERATURE: 97.2 F | SYSTOLIC BLOOD PRESSURE: 127 MMHG | HEART RATE: 87 BPM | DIASTOLIC BLOOD PRESSURE: 82 MMHG | BODY MASS INDEX: 38.19 KG/M2 | WEIGHT: 297.56 LBS

## 2024-10-18 DIAGNOSIS — T45.1X5A DRUG-INDUCED POLYNEUROPATHY: ICD-10-CM

## 2024-10-18 DIAGNOSIS — K51.018 ULCERATIVE (CHRONIC) PANCOLITIS WITH OTHER COMPLICATION: ICD-10-CM

## 2024-10-18 DIAGNOSIS — G62.0 DRUG-INDUCED POLYNEUROPATHY: ICD-10-CM

## 2024-10-18 DIAGNOSIS — J32.9 CHRONIC SINUSITIS, UNSPECIFIED: ICD-10-CM

## 2024-10-18 DIAGNOSIS — E11.8 TYPE 2 DIABETES MELLITUS WITH UNSPECIFIED COMPLICATIONS: ICD-10-CM

## 2024-10-18 DIAGNOSIS — C18.3 MALIGNANT NEOPLASM OF HEPATIC FLEXURE: ICD-10-CM

## 2024-10-18 PROCEDURE — G2211 COMPLEX E/M VISIT ADD ON: CPT

## 2024-10-18 PROCEDURE — 95251 CONT GLUC MNTR ANALYSIS I&R: CPT

## 2024-10-18 PROCEDURE — 95250 CONT GLUC MNTR PHYS/QHP EQP: CPT

## 2024-10-18 PROCEDURE — 99215 OFFICE O/P EST HI 40 MIN: CPT

## 2024-10-18 PROCEDURE — ZZZZZ: CPT

## 2024-10-18 RX ORDER — AZITHROMYCIN 250 MG/1
250 TABLET, FILM COATED ORAL
Qty: 1 | Refills: 0 | Status: ACTIVE | COMMUNITY
Start: 2024-10-18 | End: 1900-01-01

## 2024-10-31 ENCOUNTER — APPOINTMENT (OUTPATIENT)
Dept: ENDOCRINOLOGY | Facility: CLINIC | Age: 54
End: 2024-10-31
Payer: COMMERCIAL

## 2024-10-31 PROCEDURE — 97802 MEDICAL NUTRITION INDIV IN: CPT

## 2024-11-08 ENCOUNTER — RESULT REVIEW (OUTPATIENT)
Age: 54
End: 2024-11-08

## 2024-11-08 ENCOUNTER — APPOINTMENT (OUTPATIENT)
Dept: HEMATOLOGY ONCOLOGY | Facility: CLINIC | Age: 54
End: 2024-11-08
Payer: COMMERCIAL

## 2024-11-08 VITALS
RESPIRATION RATE: 16 BRPM | SYSTOLIC BLOOD PRESSURE: 122 MMHG | WEIGHT: 298.5 LBS | HEART RATE: 87 BPM | TEMPERATURE: 97.8 F | OXYGEN SATURATION: 96 % | HEIGHT: 74 IN | DIASTOLIC BLOOD PRESSURE: 70 MMHG | BODY MASS INDEX: 38.31 KG/M2

## 2024-11-08 DIAGNOSIS — T45.1X5A DRUG-INDUCED POLYNEUROPATHY: ICD-10-CM

## 2024-11-08 DIAGNOSIS — C18.3 MALIGNANT NEOPLASM OF HEPATIC FLEXURE: ICD-10-CM

## 2024-11-08 DIAGNOSIS — K51.018 ULCERATIVE (CHRONIC) PANCOLITIS WITH OTHER COMPLICATION: ICD-10-CM

## 2024-11-08 DIAGNOSIS — G62.0 DRUG-INDUCED POLYNEUROPATHY: ICD-10-CM

## 2024-11-08 PROCEDURE — 99215 OFFICE O/P EST HI 40 MIN: CPT

## 2024-11-08 PROCEDURE — G2211 COMPLEX E/M VISIT ADD ON: CPT

## 2024-11-08 RX ORDER — CAPECITABINE 500 MG/1
500 TABLET ORAL TWICE DAILY
Qty: 56 | Refills: 1 | Status: ACTIVE | COMMUNITY
Start: 2024-11-08 | End: 1900-01-01

## 2024-12-04 ENCOUNTER — APPOINTMENT (OUTPATIENT)
Dept: HEMATOLOGY ONCOLOGY | Facility: CLINIC | Age: 54
End: 2024-12-04

## 2024-12-04 ENCOUNTER — RESULT REVIEW (OUTPATIENT)
Age: 54
End: 2024-12-04

## 2025-01-09 LAB — HBA1C MFR BLD HPLC: 5.6

## 2025-01-10 ENCOUNTER — APPOINTMENT (OUTPATIENT)
Dept: ENDOCRINOLOGY | Facility: CLINIC | Age: 55
End: 2025-01-10

## 2025-01-10 ENCOUNTER — LABORATORY RESULT (OUTPATIENT)
Age: 55
End: 2025-01-10

## 2025-01-10 DIAGNOSIS — E11.8 TYPE 2 DIABETES MELLITUS WITH UNSPECIFIED COMPLICATIONS: ICD-10-CM

## 2025-01-10 LAB
ALBUMIN SERPL ELPH-MCNC: 4.7 G/DL
ALP BLD-CCNC: 77 U/L
ALT SERPL-CCNC: 28 U/L
ANION GAP SERPL CALC-SCNC: 16 MMOL/L
AST SERPL-CCNC: 31 U/L
BILIRUB SERPL-MCNC: 0.9 MG/DL
BUN SERPL-MCNC: 15 MG/DL
CALCIUM SERPL-MCNC: 9.9 MG/DL
CHLORIDE SERPL-SCNC: 101 MMOL/L
CHOLEST SERPL-MCNC: 197 MG/DL
CO2 SERPL-SCNC: 24 MMOL/L
CREAT SERPL-MCNC: 0.79 MG/DL
EGFR: 106 ML/MIN/1.73M2
FOLATE SERPL-MCNC: 13.3 NG/ML
GLUCOSE BLDC GLUCOMTR-MCNC: 110
GLUCOSE SERPL-MCNC: 92 MG/DL
HDLC SERPL-MCNC: 47 MG/DL
LDLC SERPL CALC-MCNC: 119 MG/DL
NONHDLC SERPL-MCNC: 150 MG/DL
POTASSIUM SERPL-SCNC: 4.5 MMOL/L
PROT SERPL-MCNC: 8.2 G/DL
SODIUM SERPL-SCNC: 140 MMOL/L
TRIGL SERPL-MCNC: 172 MG/DL
TSH SERPL-ACNC: 1.9 UIU/ML
VIT B12 SERPL-MCNC: >2000 PG/ML

## 2025-01-10 PROCEDURE — G2211 COMPLEX E/M VISIT ADD ON: CPT

## 2025-01-10 PROCEDURE — 99215 OFFICE O/P EST HI 40 MIN: CPT

## 2025-01-10 PROCEDURE — 95251 CONT GLUC MNTR ANALYSIS I&R: CPT

## 2025-01-10 RX ORDER — METFORMIN ER 500 MG 500 MG/1
500 TABLET ORAL
Qty: 4 | Refills: 3 | Status: ACTIVE | COMMUNITY
Start: 2025-01-10 | End: 1900-01-01

## 2025-01-10 RX ORDER — BLOOD-GLUCOSE SENSOR
EACH MISCELLANEOUS
Qty: 7 | Refills: 3 | Status: ACTIVE | COMMUNITY
Start: 2025-01-10 | End: 1900-01-01

## 2025-01-10 RX ORDER — BLOOD-GLUCOSE,RECEIVER,CONT
EACH MISCELLANEOUS
Qty: 1 | Refills: 0 | Status: ACTIVE | COMMUNITY
Start: 2025-01-10 | End: 1900-01-01

## 2025-02-25 ENCOUNTER — APPOINTMENT (OUTPATIENT)
Dept: ENDOCRINOLOGY | Facility: CLINIC | Age: 55
End: 2025-02-25

## 2025-04-07 ENCOUNTER — RX RENEWAL (OUTPATIENT)
Age: 55
End: 2025-04-07

## 2025-04-15 ENCOUNTER — APPOINTMENT (OUTPATIENT)
Dept: GASTROENTEROLOGY | Facility: CLINIC | Age: 55
End: 2025-04-15

## 2025-04-16 ENCOUNTER — RX RENEWAL (OUTPATIENT)
Age: 55
End: 2025-04-16

## 2025-04-25 ENCOUNTER — APPOINTMENT (OUTPATIENT)
Dept: ENDOCRINOLOGY | Facility: CLINIC | Age: 55
End: 2025-04-25

## 2025-05-05 ENCOUNTER — APPOINTMENT (OUTPATIENT)
Dept: HEMATOLOGY ONCOLOGY | Facility: CLINIC | Age: 55
End: 2025-05-05

## 2025-05-27 ENCOUNTER — APPOINTMENT (OUTPATIENT)
Dept: GASTROENTEROLOGY | Facility: CLINIC | Age: 55
End: 2025-05-27
Payer: COMMERCIAL

## 2025-05-27 VITALS — SYSTOLIC BLOOD PRESSURE: 140 MMHG | DIASTOLIC BLOOD PRESSURE: 90 MMHG

## 2025-05-27 DIAGNOSIS — K51.018 ULCERATIVE (CHRONIC) PANCOLITIS WITH OTHER COMPLICATION: ICD-10-CM

## 2025-05-27 DIAGNOSIS — C18.3 MALIGNANT NEOPLASM OF HEPATIC FLEXURE: ICD-10-CM

## 2025-05-27 PROCEDURE — G2211 COMPLEX E/M VISIT ADD ON: CPT

## 2025-05-27 PROCEDURE — 99214 OFFICE O/P EST MOD 30 MIN: CPT

## 2025-05-27 RX ORDER — SODIUM SULFATE, POTASSIUM SULFATE AND MAGNESIUM SULFATE 1.6; 3.13; 17.5 G/177ML; G/177ML; G/177ML
17.5-3.13-1.6 SOLUTION ORAL
Qty: 1 | Refills: 0 | Status: ACTIVE | COMMUNITY
Start: 2025-05-27 | End: 1900-01-01

## 2025-05-29 ENCOUNTER — APPOINTMENT (OUTPATIENT)
Dept: ENDOCRINOLOGY | Facility: CLINIC | Age: 55
End: 2025-05-29

## 2025-06-16 ENCOUNTER — RX RENEWAL (OUTPATIENT)
Age: 55
End: 2025-06-16

## 2025-06-16 RX ORDER — ADALIMUMAB 40MG/0.4ML
40 KIT SUBCUTANEOUS
Qty: 4 | Refills: 2 | Status: ACTIVE | COMMUNITY
Start: 2025-06-16 | End: 1900-01-01

## 2025-07-25 ENCOUNTER — APPOINTMENT (OUTPATIENT)
Dept: ENDOCRINOLOGY | Facility: CLINIC | Age: 55
End: 2025-07-25

## 2025-07-25 ENCOUNTER — RESULT REVIEW (OUTPATIENT)
Age: 55
End: 2025-07-25

## 2025-07-25 ENCOUNTER — APPOINTMENT (OUTPATIENT)
Dept: GASTROENTEROLOGY | Facility: HOSPITAL | Age: 55
End: 2025-07-25

## 2025-08-05 ENCOUNTER — TRANSCRIPTION ENCOUNTER (OUTPATIENT)
Age: 55
End: 2025-08-05

## 2025-08-11 ENCOUNTER — NON-APPOINTMENT (OUTPATIENT)
Age: 55
End: 2025-08-11

## 2025-08-11 ENCOUNTER — APPOINTMENT (OUTPATIENT)
Dept: GASTROENTEROLOGY | Facility: CLINIC | Age: 55
End: 2025-08-11
Payer: COMMERCIAL

## 2025-08-11 VITALS
WEIGHT: 305 LBS | DIASTOLIC BLOOD PRESSURE: 90 MMHG | SYSTOLIC BLOOD PRESSURE: 150 MMHG | HEIGHT: 74 IN | BODY MASS INDEX: 39.14 KG/M2

## 2025-08-11 PROCEDURE — G2211 COMPLEX E/M VISIT ADD ON: CPT

## 2025-08-11 PROCEDURE — 99215 OFFICE O/P EST HI 40 MIN: CPT

## 2025-08-22 ENCOUNTER — TRANSCRIPTION ENCOUNTER (OUTPATIENT)
Age: 55
End: 2025-08-22

## 2025-08-26 ENCOUNTER — APPOINTMENT (OUTPATIENT)
Dept: SURGERY | Facility: CLINIC | Age: 55
End: 2025-08-26
Payer: COMMERCIAL

## 2025-08-26 ENCOUNTER — RESULT REVIEW (OUTPATIENT)
Age: 55
End: 2025-08-26

## 2025-08-26 ENCOUNTER — APPOINTMENT (OUTPATIENT)
Dept: HEMATOLOGY ONCOLOGY | Facility: CLINIC | Age: 55
End: 2025-08-26

## 2025-08-26 VITALS
DIASTOLIC BLOOD PRESSURE: 82 MMHG | SYSTOLIC BLOOD PRESSURE: 143 MMHG | HEIGHT: 74 IN | HEART RATE: 94 BPM | BODY MASS INDEX: 39.91 KG/M2 | WEIGHT: 311 LBS | OXYGEN SATURATION: 95 %

## 2025-08-26 VITALS
SYSTOLIC BLOOD PRESSURE: 137 MMHG | TEMPERATURE: 97 F | WEIGHT: 310 LBS | RESPIRATION RATE: 16 BRPM | OXYGEN SATURATION: 97 % | HEIGHT: 74 IN | DIASTOLIC BLOOD PRESSURE: 81 MMHG | BODY MASS INDEX: 39.78 KG/M2 | HEART RATE: 88 BPM

## 2025-08-26 DIAGNOSIS — K51.018 ULCERATIVE (CHRONIC) PANCOLITIS WITH OTHER COMPLICATION: ICD-10-CM

## 2025-08-26 DIAGNOSIS — C18.3 MALIGNANT NEOPLASM OF HEPATIC FLEXURE: ICD-10-CM

## 2025-08-26 PROCEDURE — 99214 OFFICE O/P EST MOD 30 MIN: CPT

## 2025-08-26 PROCEDURE — G2211 COMPLEX E/M VISIT ADD ON: CPT

## 2025-08-27 ENCOUNTER — APPOINTMENT (OUTPATIENT)
Dept: HEMATOLOGY ONCOLOGY | Facility: CLINIC | Age: 55
End: 2025-08-27

## 2025-09-01 ENCOUNTER — RX RENEWAL (OUTPATIENT)
Age: 55
End: 2025-09-01

## 2025-09-02 ENCOUNTER — APPOINTMENT (OUTPATIENT)
Dept: GASTROENTEROLOGY | Facility: CLINIC | Age: 55
End: 2025-09-02

## 2025-09-10 ENCOUNTER — NON-APPOINTMENT (OUTPATIENT)
Age: 55
End: 2025-09-10